# Patient Record
Sex: MALE | Race: WHITE | NOT HISPANIC OR LATINO | Employment: FULL TIME | URBAN - NONMETROPOLITAN AREA
[De-identification: names, ages, dates, MRNs, and addresses within clinical notes are randomized per-mention and may not be internally consistent; named-entity substitution may affect disease eponyms.]

---

## 2024-09-08 ENCOUNTER — HOSPITAL ENCOUNTER (EMERGENCY)
Facility: HOSPITAL | Age: 54
Discharge: OTHER INSTITUTION | End: 2024-09-08
Attending: EMERGENCY MEDICINE
Payer: COMMERCIAL

## 2024-09-08 ENCOUNTER — APPOINTMENT (OUTPATIENT)
Dept: RADIOLOGY | Facility: HOSPITAL | Age: 54
End: 2024-09-08
Payer: COMMERCIAL

## 2024-09-08 ENCOUNTER — HOSPITAL ENCOUNTER (OUTPATIENT)
Dept: CARDIOLOGY | Facility: HOSPITAL | Age: 54
Discharge: HOME | End: 2024-09-08
Payer: COMMERCIAL

## 2024-09-08 VITALS
HEART RATE: 79 BPM | WEIGHT: 150 LBS | OXYGEN SATURATION: 100 % | DIASTOLIC BLOOD PRESSURE: 106 MMHG | SYSTOLIC BLOOD PRESSURE: 160 MMHG

## 2024-09-08 DIAGNOSIS — I21.3 ST ELEVATION MYOCARDIAL INFARCTION (STEMI), UNSPECIFIED ARTERY (MULTI): ICD-10-CM

## 2024-09-08 DIAGNOSIS — I49.01: Primary | ICD-10-CM

## 2024-09-08 LAB
ALBUMIN SERPL BCP-MCNC: 4.6 G/DL (ref 3.4–5)
ALP SERPL-CCNC: 89 U/L (ref 33–120)
ALT SERPL W P-5'-P-CCNC: 33 U/L (ref 10–52)
ANION GAP SERPL CALC-SCNC: 24 MMOL/L (ref 10–20)
AST SERPL W P-5'-P-CCNC: 33 U/L (ref 9–39)
BILIRUB SERPL-MCNC: 0.5 MG/DL (ref 0–1.2)
BUN SERPL-MCNC: 12 MG/DL (ref 6–23)
CALCIUM SERPL-MCNC: 9.7 MG/DL (ref 8.6–10.3)
CARDIAC TROPONIN I PNL SERPL HS: 126 NG/L (ref 0–20)
CARDIAC TROPONIN I PNL SERPL HS: 65 NG/L (ref 0–20)
CHLORIDE SERPL-SCNC: 102 MMOL/L (ref 98–107)
CO2 SERPL-SCNC: 17 MMOL/L (ref 21–32)
CREAT SERPL-MCNC: 1.29 MG/DL (ref 0.5–1.3)
EGFRCR SERPLBLD CKD-EPI 2021: 66 ML/MIN/1.73M*2
ERYTHROCYTE [DISTWIDTH] IN BLOOD BY AUTOMATED COUNT: 13.2 % (ref 11.5–14.5)
GLUCOSE SERPL-MCNC: 211 MG/DL (ref 74–99)
HCT VFR BLD AUTO: 51.1 % (ref 41–52)
HGB BLD-MCNC: 17.2 G/DL (ref 13.5–17.5)
HOLD SPECIMEN: NORMAL
INR PPP: 1 (ref 0.9–1.1)
LIPASE SERPL-CCNC: 28 U/L (ref 9–82)
MCH RBC QN AUTO: 31.9 PG (ref 26–34)
MCHC RBC AUTO-ENTMCNC: 33.7 G/DL (ref 32–36)
MCV RBC AUTO: 95 FL (ref 80–100)
NRBC BLD-RTO: 0.1 /100 WBCS (ref 0–0)
PLATELET # BLD AUTO: 276 X10*3/UL (ref 150–450)
POTASSIUM SERPL-SCNC: 3.8 MMOL/L (ref 3.5–5.3)
PROT SERPL-MCNC: 7 G/DL (ref 6.4–8.2)
PROTHROMBIN TIME: 11.3 SECONDS (ref 9.8–12.8)
RBC # BLD AUTO: 5.39 X10*6/UL (ref 4.5–5.9)
SODIUM SERPL-SCNC: 139 MMOL/L (ref 136–145)
WBC # BLD AUTO: 20.6 X10*3/UL (ref 4.4–11.3)

## 2024-09-08 PROCEDURE — 94681 O2 UPTK CO2 OUTP % O2 XTRC: CPT

## 2024-09-08 PROCEDURE — 84484 ASSAY OF TROPONIN QUANT: CPT | Performed by: PHYSICIAN ASSISTANT

## 2024-09-08 PROCEDURE — 2500000005 HC RX 250 GENERAL PHARMACY W/O HCPCS: Performed by: EMERGENCY MEDICINE

## 2024-09-08 PROCEDURE — 2500000004 HC RX 250 GENERAL PHARMACY W/ HCPCS (ALT 636 FOR OP/ED): Performed by: EMERGENCY MEDICINE

## 2024-09-08 PROCEDURE — 96375 TX/PRO/DX INJ NEW DRUG ADDON: CPT | Mod: 59

## 2024-09-08 PROCEDURE — 31500 INSERT EMERGENCY AIRWAY: CPT

## 2024-09-08 PROCEDURE — 85027 COMPLETE CBC AUTOMATED: CPT | Performed by: PHYSICIAN ASSISTANT

## 2024-09-08 PROCEDURE — 80053 COMPREHEN METABOLIC PANEL: CPT | Performed by: PHYSICIAN ASSISTANT

## 2024-09-08 PROCEDURE — 85610 PROTHROMBIN TIME: CPT | Performed by: PHYSICIAN ASSISTANT

## 2024-09-08 PROCEDURE — 94002 VENT MGMT INPAT INIT DAY: CPT

## 2024-09-08 PROCEDURE — 94799 UNLISTED PULMONARY SVC/PX: CPT

## 2024-09-08 PROCEDURE — 2500000004 HC RX 250 GENERAL PHARMACY W/ HCPCS (ALT 636 FOR OP/ED)

## 2024-09-08 PROCEDURE — 94762 N-INVAS EAR/PLS OXIMTRY CONT: CPT

## 2024-09-08 PROCEDURE — 93005 ELECTROCARDIOGRAM TRACING: CPT

## 2024-09-08 PROCEDURE — 70450 CT HEAD/BRAIN W/O DYE: CPT | Performed by: RADIOLOGY

## 2024-09-08 PROCEDURE — 99292 CRITICAL CARE ADDL 30 MIN: CPT | Mod: 25

## 2024-09-08 PROCEDURE — 2500000004 HC RX 250 GENERAL PHARMACY W/ HCPCS (ALT 636 FOR OP/ED): Performed by: PHYSICIAN ASSISTANT

## 2024-09-08 PROCEDURE — 92950 HEART/LUNG RESUSCITATION CPR: CPT | Performed by: EMERGENCY MEDICINE

## 2024-09-08 PROCEDURE — 71045 X-RAY EXAM CHEST 1 VIEW: CPT | Performed by: RADIOLOGY

## 2024-09-08 PROCEDURE — 36415 COLL VENOUS BLD VENIPUNCTURE: CPT | Performed by: PHYSICIAN ASSISTANT

## 2024-09-08 PROCEDURE — 83690 ASSAY OF LIPASE: CPT | Performed by: PHYSICIAN ASSISTANT

## 2024-09-08 PROCEDURE — 31500 INSERT EMERGENCY AIRWAY: CPT | Performed by: EMERGENCY MEDICINE

## 2024-09-08 PROCEDURE — 70450 CT HEAD/BRAIN W/O DYE: CPT

## 2024-09-08 PROCEDURE — 72125 CT NECK SPINE W/O DYE: CPT | Performed by: RADIOLOGY

## 2024-09-08 PROCEDURE — 99291 CRITICAL CARE FIRST HOUR: CPT | Mod: 25

## 2024-09-08 PROCEDURE — 72125 CT NECK SPINE W/O DYE: CPT

## 2024-09-08 PROCEDURE — 96365 THER/PROPH/DIAG IV INF INIT: CPT | Mod: 59

## 2024-09-08 PROCEDURE — 71045 X-RAY EXAM CHEST 1 VIEW: CPT

## 2024-09-08 PROCEDURE — 96366 THER/PROPH/DIAG IV INF ADDON: CPT | Mod: 59

## 2024-09-08 PROCEDURE — 31720 CLEARANCE OF AIRWAYS: CPT

## 2024-09-08 RX ORDER — MIDAZOLAM HYDROCHLORIDE 1 MG/ML
2 INJECTION INTRAMUSCULAR; INTRAVENOUS ONCE
Status: COMPLETED | OUTPATIENT
Start: 2024-09-08 | End: 2024-09-08

## 2024-09-08 RX ORDER — PROPOFOL 10 MG/ML
INJECTION, EMULSION INTRAVENOUS
Status: COMPLETED
Start: 2024-09-08 | End: 2024-09-08

## 2024-09-08 RX ORDER — HEPARIN SODIUM 10000 [USP'U]/100ML
0-4000 INJECTION, SOLUTION INTRAVENOUS CONTINUOUS
Status: DISCONTINUED | OUTPATIENT
Start: 2024-09-08 | End: 2024-09-08 | Stop reason: HOSPADM

## 2024-09-08 RX ORDER — AMIODARONE HYDROCHLORIDE 150 MG/3ML
INJECTION, SOLUTION INTRAVENOUS CODE/TRAUMA/SEDATION MEDICATION
Status: COMPLETED | OUTPATIENT
Start: 2024-09-08 | End: 2024-09-08

## 2024-09-08 RX ORDER — ONDANSETRON HYDROCHLORIDE 2 MG/ML
INJECTION, SOLUTION INTRAVENOUS
Status: COMPLETED
Start: 2024-09-08 | End: 2024-09-08

## 2024-09-08 RX ORDER — PROPOFOL 10 MG/ML
0-50 INJECTION, EMULSION INTRAVENOUS CONTINUOUS
Status: DISCONTINUED | OUTPATIENT
Start: 2024-09-08 | End: 2024-09-08 | Stop reason: HOSPADM

## 2024-09-08 RX ORDER — SODIUM CHLORIDE 9 MG/ML
125 INJECTION, SOLUTION INTRAVENOUS CONTINUOUS
Status: DISCONTINUED | OUTPATIENT
Start: 2024-09-08 | End: 2024-09-08 | Stop reason: HOSPADM

## 2024-09-08 RX ORDER — VECURONIUM BROMIDE FOR INJECTION 1 MG/ML
10 INJECTION, POWDER, LYOPHILIZED, FOR SOLUTION INTRAVENOUS ONCE
Status: COMPLETED | OUTPATIENT
Start: 2024-09-08 | End: 2024-09-08

## 2024-09-08 RX ORDER — ETOMIDATE 2 MG/ML
INJECTION INTRAVENOUS CODE/TRAUMA/SEDATION MEDICATION
Status: COMPLETED | OUTPATIENT
Start: 2024-09-08 | End: 2024-09-08

## 2024-09-08 RX ORDER — SUCCINYLCHOLINE CHLORIDE 20 MG/ML
INJECTION INTRAMUSCULAR; INTRAVENOUS CODE/TRAUMA/SEDATION MEDICATION
Status: COMPLETED | OUTPATIENT
Start: 2024-09-08 | End: 2024-09-08

## 2024-09-08 NOTE — NURSING NOTE
Patient left with life flight crew on life flights vent. Sat 100%, bilateral breath sounds confirmed after transfer to their cot.

## 2024-09-08 NOTE — NURSING NOTE
Code Blue called at 1721. CPR in progress at RT arrival to ER rm. 100% Ambu initiated immediately via face mask. % sats observed.  Pt intubated by Dr. Castrejon w 8.0 ETT w/o diff. 24 cm @ lipline. Good color change noted on CO2 detector.  BBS confirmed by RN. 100% Ambu continued achieving 100% sat cont.  Pt transported to CT with 100% Ambu and returned to ER. 100% Ambu contd with ETA of 20 min for [facility].

## 2024-09-08 NOTE — ED PROVIDER NOTES
HPI   No chief complaint on file.      Patient presents to the emergency department accompanied by his wife.  They arrived by private transport.  When walking through the front door the patient collapsed suddenly and a rapid response was called.  His wife is providing history.  His wife states that for the past 2 hours the patient has been restless, diaphoretic, and complaining of pain over the anterior chest wall and bilateral shoulders.  He has no known medical history but has not seen a physician in quite some time, years according to his wife.    When the rapid response was called to the front of the hospital the patient was lying on the ground seizing.  Security camera footage shows that the patient did fall and his wife did assist his fall to some extent but it is not known how much.  The patient's seizure was noted to break on his own and I witnessed this and I moved the patient to an EMS gurney with assistance of the nursing staff to take him back to an examination room.      History provided by:  Spouse  History limited by:  Acuity of condition, mental status change and patient unresponsive   used: No            Patient History   No past medical history on file.  No past surgical history on file.  No family history on file.  Social History     Tobacco Use    Smoking status: Not on file    Smokeless tobacco: Not on file   Substance Use Topics    Alcohol use: Not on file    Drug use: Not on file       Physical Exam   ED Triage Vitals   Temp Pulse Resp BP   -- -- -- --      SpO2 Temp src Heart Rate Source Patient Position   -- -- -- --      BP Location FiO2 (%)     -- --       Physical Exam  Vitals and nursing note reviewed.   Constitutional:       General: He is in acute distress.      Appearance: He is normal weight. He is ill-appearing, toxic-appearing and diaphoretic.      Comments: Patient is postictal with sonorous respirations when he is being brought back on the ER gurney from the  triage area.  He is notably diaphoretic.   HENT:      Head: Normocephalic and atraumatic.      Nose: Nose normal. No rhinorrhea.      Mouth/Throat:      Comments: Patient has dentures that are removed by the nursing staff  Neck:      Comments: Trachea is midline  Cardiovascular:      Rate and Rhythm: Regular rhythm. Tachycardia present.      Heart sounds: No murmur heard.  Pulmonary:      Comments: Patient has sonorous respirations and is postictal  Abdominal:      General: Abdomen is flat. Bowel sounds are normal. There is no distension.      Palpations: Abdomen is soft.      Tenderness: There is no abdominal tenderness.   Musculoskeletal:         General: No deformity.   Skin:     General: Skin is warm.      Findings: No rash.      Comments: Patient is diaphoretic.  Patient has noted erythema to the face and anterior neck.   Neurological:      Mental Status: He is disoriented.      Comments: Patient is postictal   Psychiatric:      Comments: Patient is unresponsive and postictal           ED Course & MDM   Diagnoses as of 09/08/24 1759   Ventricular fibrillation seen on cardiac monitor (Multi)   ST elevation myocardial infarction (STEMI), unspecified artery (Multi)                 No data recorded                                 Medical Decision Making  After the patient was brought back to ER room #1 from the triage area he was noted to be postictal.  He had sonorous respirations and then became apneic and pulseless.  CPR was initiated immediately by the nursing staff.  The patient was then noted to have a period where he was breathing on his own however when he was placed on the cardiac monitor he was noted to be in ventricular fibrillation.  The patient was cardioverted with 200 J of synchronized electricity and CPR was reinitiated immediately.  At this time the patient was given 300 mg of IV amiodarone.  The patient was noted to be in sinus tachycardia at the first pulse check and was breathing on his own.  He  was still however not responsive.  At this point I electively intubated the patient as I did not feel he could protect his airway and in anticipation of transfer to a higher level of care.  Please see my separate procedure note for this procedure which was performed without difficulty and on the first attempt.    Twelve-lead EKG was interpreted by myself this was noted to contribute directly to patient care.  Study reveals a sinus tachycardia 134 bpm, and notable ST segment elevation in leads V3 through V5 consistent with an anterior wall myocardial infarction.    Immediately after the EKG was obtained a STEMI alert was paged overhead.    The patient's wife is at bedside and she was updated to the patient's condition by myself.  I explained that the patient is having a myocardial infarction and will need to be transferred to a higher level of care.  The patient's wife is requesting that the patient be transferred to [facility] in [place].  I spoke to Dr. Waldo Pena who is the interventional cardiologist covering that facility and he accepted the patient to his service.    The patient did fall out in triage and it is uncertain if he struck his head or not.  Therefore CAT scan of the patient's head and cervical spine were performed out of concern for trauma related to this fall.  I did not identify any evidence of large intracranial hemorrhage on the CT therefore heparin was initiated.    The patient's wife and daughter are at bedside and I updated them and they are in agreement with this treatment course and plan.    Critical care time for this patient excluding billable procedures is 100 minutes secondary to multiple repeat physical examinations, interpretation of radiographic studies, and expert consultation.        Procedure  Intubation    Performed by: Aubrey Castrejon DO  Authorized by: Aubrey Castrejon DO    Consent:     Consent obtained:  Emergent situation    Consent given by:  Spouse    Risks,  benefits, and alternatives were discussed: yes      Risks discussed:  Death, aspiration and brain injury    Alternatives discussed:  No treatment  Universal protocol:     Procedure explained and questions answered to patient or proxy's satisfaction: yes      Relevant documents present and verified: yes      Test results available: no      Imaging studies available: no      Required blood products, implants, devices, and special equipment available: yes      Site/side marked: yes      Immediately prior to procedure, a time out was called: yes      Patient identity confirmed:  Arm band  Pre-procedure details:     Indications: altered consciousness and cardio/pulmonary arrest      Patient status:  Unresponsive    Look externally: no concerns      Mouth opening - incisor distance:  3 or more finger widths    Hyoid-mental distance: 3 or more finger widths      Hyoid-thyroid distance: 2 or more finger widths      Mallampati score:  I    Obstruction: none      Neck mobility: normal      Pharmacologic strategy: RSI      Induction agents:  Etomidate    Paralytics:  Succinylcholine  Procedure details:     Preoxygenation:  Bag valve mask    CPR in progress: no      Number of attempts:  1  Successful intubation attempt details:     Intubation method:  Oral    Intubation technique: video assisted      Laryngoscope blade:  Mac 4    Bougie used: no      Grade view: I      Tube size (mm):  8.0    Tube type:  Cuffed    Tube visualized through cords: yes    Placement assessment:     ETT at teeth/gumline (cm):  24    Tube secured with:  ETT kay    Breath sounds:  Equal    Placement verification: chest rise, colorimetric ETCO2, CXR verification and equal breath sounds      CXR findings:  Appropriate position  Post-procedure details:     Procedure completion:  Tolerated well, no immediate complications       Aubrey Castrejon DO  09/08/24 8936

## 2024-09-15 LAB
Q ONSET: 221 MS
QRS COUNT: 22 BEATS
QRS DURATION: 70 MS
QT INTERVAL: 312 MS
QTC CALCULATION(BAZETT): 465 MS
QTC FREDERICIA: 407 MS
R AXIS: -55 DEGREES
T AXIS: 36 DEGREES
T OFFSET: 377 MS
VENTRICULAR RATE: 134 BPM

## 2024-10-01 ENCOUNTER — EVALUATION (OUTPATIENT)
Dept: SPEECH THERAPY | Facility: CLINIC | Age: 54
End: 2024-10-01
Payer: COMMERCIAL

## 2024-10-01 DIAGNOSIS — G31.84 MILD COGNITIVE IMPAIRMENT WITH MEMORY LOSS: ICD-10-CM

## 2024-10-01 DIAGNOSIS — R41.841 COGNITIVE COMMUNICATION DEFICIT: Primary | ICD-10-CM

## 2024-10-01 PROCEDURE — 96125 COGNITIVE TEST BY HC PRO: CPT | Mod: GN | Performed by: SPEECH-LANGUAGE PATHOLOGIST

## 2024-10-01 ASSESSMENT — PATIENT HEALTH QUESTIONNAIRE - PHQ9
2. FEELING DOWN, DEPRESSED OR HOPELESS: NOT AT ALL
1. LITTLE INTEREST OR PLEASURE IN DOING THINGS: NOT AT ALL
SUM OF ALL RESPONSES TO PHQ9 QUESTIONS 1 AND 2: 0

## 2024-10-01 ASSESSMENT — SOCIAL DETERMINANTS OF HEALTH (SDOH)
WITHIN THE LAST YEAR, HAVE TO BEEN RAPED OR FORCED TO HAVE ANY KIND OF SEXUAL ACTIVITY BY YOUR PARTNER OR EX-PARTNER?: NO
WITHIN THE LAST YEAR, HAVE YOU BEEN HUMILIATED OR EMOTIONALLY ABUSED IN OTHER WAYS BY YOUR PARTNER OR EX-PARTNER?: NO
WITHIN THE LAST YEAR, HAVE YOU BEEN KICKED, HIT, SLAPPED, OR OTHERWISE PHYSICALLY HURT BY YOUR PARTNER OR EX-PARTNER?: NO
WITHIN THE LAST YEAR, HAVE YOU BEEN AFRAID OF YOUR PARTNER OR EX-PARTNER?: NO

## 2024-10-01 ASSESSMENT — ENCOUNTER SYMPTOMS
DEPRESSION: 0
OCCASIONAL FEELINGS OF UNSTEADINESS: 0
LOSS OF SENSATION IN FEET: 0

## 2024-10-01 ASSESSMENT — PAIN SCALES - GENERAL
PAINLEVEL_OUTOF10: 2
PAINLEVEL_OUTOF10: 2

## 2024-10-01 ASSESSMENT — PAIN - FUNCTIONAL ASSESSMENT
PAIN_FUNCTIONAL_ASSESSMENT: 0-10
PAIN_FUNCTIONAL_ASSESSMENT: 0-10

## 2024-10-01 NOTE — PROGRESS NOTES
Speech-Language Pathology    SLP Adult Outpatient Speech-Language Cognition    Patient Name: Christopher D Hosler  MRN: [id number]  Today's Date: 10/1/2024      Time Calculation  Start Time: 1435  Stop Time: 1530  Time Calculation (min): 55 min      Current Problem:   1. Cognitive communication deficit  Follow Up In Speech Therapy      2. Mild cognitive impairment with memory loss  Referral to Speech Therapy            SLP Assessment:  SLP Assessment  SLP TX Intervention Outcome: Making Progress Towards Goals  SLP Assessment Results: Expression deficits  Prognosis: Excellent  Treatment Provided: No  Medical Staff Made Aware: Yes  Strengths: Cognition, Motivation, Family/Caregiver Support  Barriers: None  Education Provided: Yes      SLP Plan:  Plan  Inpatient/Swing Bed or Outpatient: Outpatient  Treatment/Interventions: Communication functioning  SLP TX Plan: Continue Plan of Care  SLP Plan: Skilled SLP  SLP Frequency: One time visit  Duration: 30 days  SLP Discharge Recommendations: Home independent  Next Treatment Priority: WAB  Discussed POC: Patient, Caregiver/family  Discussed Risks/Benefits: Yes  Patient/Caregiver Agreeable: Yes  SLP - OK to Discharge: No    Cognitive-Communication Goal:  In one month...  Nayan will complete the Western Aphasia Battery (WAB), Third Edition in order to further assess language skills and inform POC as appropriate.   Goal Start: 10/1/2024  Anticipated End: 11/1/2024  Goal End:      Subjective   Current Problem:  Nayan was accompanied by his spouse to today's evaluation.   He does not feel there are any outstanding speech or cognitive deficits. His spouse reported continued improvements with cognition daily, though still some difficulty with short term recall.     Patient had cardiac arrest, was placed in a medically induced coma during hospitalization.   Briefly seen at acute rehab though discharged after 3 days - recommended follow up with OP ST for cognitive  communication deficits.   Per spouse, patient currently still has blood clot in heart but is on blood thinners. Is wearing life vest.     Patient's goal for therapy:  Be able to return to [facility] for all ADLs.     Most Recent Visit:  SLP Most Recent Visit  SLP Received On: 10/01/24      General Visit Information:  General Information  Chart Reviewed: Yes  Arrival: Family/caregiver present  Reason for Referral: Cognitive communication deficits  Referred By: Dr. Lopez  Past Medical History Relevant to Rehab: MI  Patient Seen During This Visit: Yes  Total Number of Visits : 1      Objective     Pain:  Pain Assessment  Pain Assessment: 0-10  0-10 (Numeric) Pain Score: 2  Pain Type: Acute pain  Pain Location: Chest  Pain Orientation: Left      Cognition:  Cognition  Overall Cognitive Status: Within Functional Limits  Attention: Within Functional Limits  Memory: Within Funtional Limits  Problem Solving: Within Functional Limits      Auditory Comprehension:   Auditory Comprehension  Yes/No Questions: Within Functional Limits  Commands: Within Functional Limits      Verbal:  Verbal Expression  Primary Mode of Expression: Verbal  Primary Language: English  Confrontation Naming: Within Functional Limits  Responsive Naming: Impaired  Open Ended Questions: Within Functional Limits  Conversation: Within Functional Limits      SLP Outcome Measures:  The Cognitive Linguistic Quick Test (CLQT) was administered in order to assess Nayan's cognitive-linguistic function compared to same age individuals.  The CLQT is a standardized assessment that obtains information on the individual's attention, memory, executive function, language, and visuospatial skills. Based on the scores, the person's skills are then given a severity rating from within functional limits up to a severe impairment.    SUBTEST SCORES:  Personal Facts 8/8  Symbol Cancellation 12/12  Confrontation Naming 10/10  Clock Drawing 13/13  Story Retelling 9/10  Symbol  Trails 10/10  Generative Naming 5/9  Design Memory 6/6  Mazes 8/8  Design Generation     COMPOSITE SCORES:      Score   Severity Rating  Attention   208   WFL  Memory   175   WFL  Executive Function  31   WFL  Language   32   WFL  Visuospatial Skills  100   WFL  Clock Drawing   13   WFL    Overall Composite Severity Ratin.0 - WFL       Outpatient Education:  Adult Outpatient Education  Individual(s) Educated: Patient, Spouse  Verbal Education : Results of the assessment, activities for cognitive communication skills at home  Risk and Benefits Discussed with Patient/Caregiver/Other: yes  Patient/Caregiver Demonstrated Understanding: yes  Plan of Care Discussed and Agreed Upon: yes  Patient Response to Education: Patient/Caregiver Verbalized Understanding of Information, Patient/Caregiver Asked Appropriate Questions

## 2024-10-01 NOTE — LETTER
October 1, 2024    Dipika Lopez MD  [address] [facility] [address]    Patient: Christopher D Hosler   YOB: 1970   Date of Visit: 10/1/2024       Dear No referring provider defined for this encounter.    The attached plan of care is being sent to you because your patient’s medical reimbursement requires that you certify the plan of care. Your signature is required to allow uninterrupted insurance coverage.      You may indicate your approval by signing below and faxing this form back to us at Dept Fax: [phone].    Please call Dept: [phone] with any questions or concerns.    Thank you for this referral,        Hollie Sherwood CCC-SLP  [address] [address]    Payer: Payor: [employer] / Plan: [employer] HEALTH PLAN / Product Type: *No Product type* /                                                                         Date:     Dear VIVIANE Robbins,     Re: Mr. Christopher Hosler, MRN:[id number]    I certify that I have reviewed the attached plan of care and it is medically necessary for Mr. Christopher Hosler (1970) who is under my care.          ______________________________________                    _________________  Provider name and credentials                                           Date and time                                                                                           Plan of Care 10/1/24   Effective from: 10/1/2024  Effective to: 11/1/2024    Plan ID: [id number]            Participants as of Finalize on 10/1/2024    Name Type Comments Contact Info    Dipika Lopez MD Referring Provider  [phone]    Hollie Sherwood CCC-SLP Speech Language Pathologist  [phone]       Last Plan Note     Author: VIVIANE Robbins Status: Signed Last edited: 10/1/2024  2:30 PM       Speech-Language Pathology    SLP Adult Outpatient Speech-Language Cognition    Patient Name:  Christopher D Hosler  MRN: [id number]  Today's Date: 10/1/2024      Time Calculation  Start Time: 1435  Stop Time: 1530  Time Calculation (min): 55 min      Current Problem:   1. Cognitive communication deficit  Follow Up In Speech Therapy      2. Mild cognitive impairment with memory loss  Referral to Speech Therapy            SLP Assessment:  SLP Assessment  SLP TX Intervention Outcome: Making Progress Towards Goals  SLP Assessment Results: Expression deficits  Prognosis: Excellent  Treatment Provided: No  Medical Staff Made Aware: Yes  Strengths: Cognition, Motivation, Family/Caregiver Support  Barriers: None  Education Provided: Yes      SLP Plan:  Plan  Inpatient/Swing Bed or Outpatient: Outpatient  Treatment/Interventions: Communication functioning  SLP TX Plan: Continue Plan of Care  SLP Plan: Skilled SLP  SLP Frequency: One time visit  Duration: 30 days  SLP Discharge Recommendations: Home independent  Next Treatment Priority: WAB  Discussed POC: Patient, Caregiver/family  Discussed Risks/Benefits: Yes  Patient/Caregiver Agreeable: Yes  SLP - OK to Discharge: No    Cognitive-Communication Goal:  In one month...  Nayan will complete the Western Aphasia Battery (WAB), Third Edition in order to further assess language skills and inform POC as appropriate.   Goal Start: 10/1/2024  Anticipated End: 11/1/2024  Goal End:      Subjective   Current Problem:  Nayan was accompanied by his spouse to today's evaluation.   He does not feel there are any outstanding speech or cognitive deficits. His spouse reported continued improvements with cognition daily, though still some difficulty with short term recall.     Patient had cardiac arrest, was placed in a medically induced coma during hospitalization.   Briefly seen at acute rehab though discharged after 3 days - recommended follow up with OP ST for cognitive communication deficits.   Per spouse, patient currently still has blood clot in heart but is on blood  thinners. Is wearing life vest.     Patient's goal for therapy:  Be able to return to [facility] for all ADLs.     Most Recent Visit:  SLP Most Recent Visit  SLP Received On: 10/01/24      General Visit Information:  General Information  Chart Reviewed: Yes  Arrival: Family/caregiver present  Reason for Referral: Cognitive communication deficits  Referred By: Dr. Lopez  Past Medical History Relevant to Rehab: MI  Patient Seen During This Visit: Yes  Total Number of Visits : 1      Objective     Pain:  Pain Assessment  Pain Assessment: 0-10  0-10 (Numeric) Pain Score: 2  Pain Type: Acute pain  Pain Location: Chest  Pain Orientation: Left      Cognition:  Cognition  Overall Cognitive Status: Within Functional Limits  Attention: Within Functional Limits  Memory: Within Funtional Limits  Problem Solving: Within Functional Limits      Auditory Comprehension:   Auditory Comprehension  Yes/No Questions: Within Functional Limits  Commands: Within Functional Limits      Verbal:  Verbal Expression  Primary Mode of Expression: Verbal  Primary Language: English  Confrontation Naming: Within Functional Limits  Responsive Naming: Impaired  Open Ended Questions: Within Functional Limits  Conversation: Within Functional Limits      SLP Outcome Measures:  The Cognitive Linguistic Quick Test (CLQT) was administered in order to assess Nayan's cognitive-linguistic function compared to same age individuals.  The CLQT is a standardized assessment that obtains information on the individual's attention, memory, executive function, language, and visuospatial skills. Based on the scores, the person's skills are then given a severity rating from within functional limits up to a severe impairment.    SUBTEST SCORES:  Personal Facts 8/8  Symbol Cancellation 12/12  Confrontation Naming 10/10  Clock Drawing 13/13  Story Retelling 9/10  Symbol Trails 10/10  Generative Naming 5/9  Design Memory 6/6  Mazes 8/8  Design Generation 8/13    COMPOSITE  SCORES:      Score   Severity Rating  Attention   208   WFL  Memory   175   WFL  Executive Function  31   WFL  Language   32   WFL  Visuospatial Skills  100   WFL  Clock Drawing   13   WFL    Overall Composite Severity Ratin.0 - WFL       Outpatient Education:  Adult Outpatient Education  Individual(s) Educated: Patient, Spouse  Verbal Education : Results of the assessment, activities for cognitive communication skills at home  Risk and Benefits Discussed with Patient/Caregiver/Other: yes  Patient/Caregiver Demonstrated Understanding: yes  Plan of Care Discussed and Agreed Upon: yes  Patient Response to Education: Patient/Caregiver Verbalized Understanding of Information, Patient/Caregiver Asked Appropriate Questions           Current Participants as of 10/1/2024    Name Type Comments Contact Info    Dipika Lopez MD Referring Provider  [phone]    Signature pending    Hollie Sherwood CCC-SLP Speech Language Pathologist  [phone]

## 2024-10-14 ENCOUNTER — TRANSCRIBE ORDERS (OUTPATIENT)
Dept: CARDIAC REHAB | Facility: HOSPITAL | Age: 54
End: 2024-10-14
Payer: COMMERCIAL

## 2024-10-14 DIAGNOSIS — I21.3 ST ELEVATION MYOCARDIAL INFARCTION (STEMI), UNSPECIFIED ARTERY (MULTI): ICD-10-CM

## 2024-10-14 DIAGNOSIS — I46.9 CARDIAC ARREST: ICD-10-CM

## 2024-10-16 ENCOUNTER — APPOINTMENT (OUTPATIENT)
Dept: SPEECH THERAPY | Facility: CLINIC | Age: 54
End: 2024-10-16
Payer: COMMERCIAL

## 2024-10-23 ENCOUNTER — TREATMENT (OUTPATIENT)
Dept: SPEECH THERAPY | Facility: CLINIC | Age: 54
End: 2024-10-23
Payer: COMMERCIAL

## 2024-10-23 ENCOUNTER — CLINICAL SUPPORT (OUTPATIENT)
Dept: CARDIAC REHAB | Facility: HOSPITAL | Age: 54
End: 2024-10-23
Payer: COMMERCIAL

## 2024-10-23 ENCOUNTER — APPOINTMENT (OUTPATIENT)
Dept: PRIMARY CARE | Facility: CLINIC | Age: 54
End: 2024-10-23
Payer: COMMERCIAL

## 2024-10-23 VITALS
DIASTOLIC BLOOD PRESSURE: 70 MMHG | HEIGHT: 70 IN | BODY MASS INDEX: 21.04 KG/M2 | WEIGHT: 146.98 LBS | OXYGEN SATURATION: 97 % | SYSTOLIC BLOOD PRESSURE: 117 MMHG

## 2024-10-23 DIAGNOSIS — I46.9 CARDIAC ARREST: ICD-10-CM

## 2024-10-23 DIAGNOSIS — I21.3 ST ELEVATION MYOCARDIAL INFARCTION (STEMI), UNSPECIFIED ARTERY (MULTI): ICD-10-CM

## 2024-10-23 DIAGNOSIS — R41.841 COGNITIVE COMMUNICATION DEFICIT: ICD-10-CM

## 2024-10-23 PROCEDURE — 92507 TX SP LANG VOICE COMM INDIV: CPT | Mod: GN

## 2024-10-23 ASSESSMENT — PATIENT HEALTH QUESTIONNAIRE - PHQ9
SUM OF ALL RESPONSES TO PHQ9 QUESTIONS 1 & 2: 0
1. LITTLE INTEREST OR PLEASURE IN DOING THINGS: NOT AT ALL
SUM OF ALL RESPONSES TO PHQ QUESTIONS 1-9: 3
7. TROUBLE CONCENTRATING ON THINGS, SUCH AS READING THE NEWSPAPER OR WATCHING TELEVISION: NOT AT ALL
8. MOVING OR SPEAKING SO SLOWLY THAT OTHER PEOPLE COULD HAVE NOTICED. OR THE OPPOSITE, BEING SO FIGETY OR RESTLESS THAT YOU HAVE BEEN MOVING AROUND A LOT MORE THAN USUAL: NOT AT ALL
6. FEELING BAD ABOUT YOURSELF - OR THAT YOU ARE A FAILURE OR HAVE LET YOURSELF OR YOUR FAMILY DOWN: NOT AT ALL
SUM OF ALL RESPONSES TO PHQ QUESTIONS 1-9: 3
2. FEELING DOWN, DEPRESSED OR HOPELESS: NOT AT ALL
5. POOR APPETITE OR OVEREATING: NOT AT ALL
9. THOUGHTS THAT YOU WOULD BE BETTER OFF DEAD, OR OF HURTING YOURSELF: NOT AT ALL
4. FEELING TIRED OR HAVING LITTLE ENERGY: SEVERAL DAYS
3. TROUBLE FALLING OR STAYING ASLEEP OR SLEEPING TOO MUCH: MORE THAN HALF THE DAYS

## 2024-10-23 ASSESSMENT — PAIN - FUNCTIONAL ASSESSMENT: PAIN_FUNCTIONAL_ASSESSMENT: 0-10

## 2024-10-23 ASSESSMENT — DUKE ACTIVITY SCORE INDEX (DASI)
CAN YOU PARTICIPATE IN STRENOUS SPORTS LIKE SWIMMING, SINGLES TENNIS, FOOTBALL, BASKETBALL, OR SKIING: NO
CAN YOU DO MODERATE WORK AROUND THE HOUSE LIKE VACUUMING, SWEEPING FLOORS OR CARRYING GROCERIES: YES
CAN YOU RUN A SHORT DISTANCE: NO
CAN YOU HAVE SEXUAL RELATIONS: YES
CAN YOU CLIMB A FLIGHT OF STAIRS OR WALK UP A HILL: YES
CAN YOU DO HEAVY WORK AROUND THE HOUSE LIKE SCRUBBING FLOORS OR LIFTING AND MOVING HEAVY FURNITURE: NO
CAN YOU DO LIGHT WORK AROUND THE HOUSE LIKE DUSTING OR WASHING DISHES: YES
TOTAL_SCORE: 24.2
CAN YOU PARTICIPATE IN MODERATE RECREATIONAL ACTIVITIES LIKE GOLF, BOWLING, DANCING, DOUBLES TENNIS OR THROWING A BASEBALL OR FOOTBALL: NO
CAN YOU WALK A BLOCK OR TWO ON LEVEL GROUND: YES
DASI METS SCORE: 5.7
CAN YOU DO YARD WORK LIKE RAKING LEAVES, WEEDING OR PUSHING A MOWER: NO
CAN YOU TAKE CARE OF YOURSELF (EAT, DRESS, BATHE, OR USE TOILET): YES
CAN YOU WALK INDOORS, SUCH AS AROUND YOUR HOUSE: YES

## 2024-10-23 ASSESSMENT — PAIN SCALES - GENERAL: PAINLEVEL_OUTOF10: 0 - NO PAIN

## 2024-10-23 NOTE — PROGRESS NOTES
Speech-Language Pathology    SLP Adult Outpatient Speech-Language Pathology Treatment     Patient Name: Christopher D Hosler  MRN: [id number]  Today's Date: 10/23/2024     Time Calculation  Start Time: 1515  Stop Time: 1540  Time Calculation (min): 25 min      Current Problem:   1. Cognitive communication deficit  Follow Up In Speech Therapy            SLP Assessment:  SLP TX Intervention Outcome: Making Progress Towards Goals  SLP Assessment Results: Expression deficits  Prognosis: Excellent  Treatment Tolerance: Patient tolerated treatment well  Strengths: Cognition, Motivation  Barriers: None  Education Provided: Yes       Plan:  Inpatient/Swing Bed or Outpatient: Outpatient  SLP TX Plan: Discharge from Speech Therapy  SLP Plan: No skilled SLP  No Skilled SLP: Skills appropriate for age level  Discussed Risks/Benefits: Yes  Patient/Caregiver Agreeable: Yes  SLP - OK to Discharge: Yes      Subjective   Christopher D Hosler arrived independently. They transferred with no difficulty. They were pleasant, cooperative, and participated in all therapy activities.     Christopher D Hosler reported that he has no speech/language/cognitive concerns.     Most Recent Visit:  SLP Received On: 10/23/24    General Visit Information:   Referred By: Dr. Lopez  Past Medical History Relevant to Rehab: MI  Total Number of Visits : 2      Pain Assessment:  Pain Assessment  Pain Assessment: 0-10  0-10 (Numeric) Pain Score: 0 - No pain      Objective   Cognitive-Communication Goal:  In one month...  Nayan will complete the Western Aphasia Battery (WAB), Third Edition in order to further assess language skills and inform POC as appropriate. GOAL MET               Goal Start: 10/1/2024              Anticipated End: 11/1/2024                Goal End: 10/23/24      Treatment Data  The Western Aphasia Battery, Revised (WAB-R) was administered in order to assess Christopher D Hosler's language function following an acquired  neurological disorder. The WAB-R measures both linguistic and non-linguistics skills including speech content, fluency, auditory comprehension, repetition, naming, reading, writing, drawing, calculation, block design and apraxia. The composite scores obtained are an Aphasia Quotient, Language Quotient, and Cortical Quotient.     The purpose of the WAB-R is to determine the presence, severity, and type of aphasia; measure the patient's level of performance to provide a baseline for detecting any change over time; and provide comprehensive assessment of the patient's language assets and deficits in order to guide treatment and management.    Christopher D Hosler received the following scores:    SUBCATEGORIES  Spontaneous Speech 10/10  Fluency, Grammatical Competence, and Paraphasias: 10/10  Yes/No Questions 60/60  Auditory Word Recognition 60/60   Sequential Commands 80/80  Repetition 100/100  Object Naming 60/60  Word Fluency 17/20  Sentence Completion 1010  Responsive Speech 10/10      APHASIA QUOTIENT   Spontaneous Speech: 20/20  Auditory Verbal Comprehension: 10/10  Repetition: 10/10  Naming and Word Findin.7/10     Aphasia Quotient: 99.4/100  Aphasia Type: Anomic Aphasia    Outpatient Education:  Adult Outpatient Education  Individual(s) Educated: Patient  Verbal Education : Results of the assessment, activities for cognitive communication skills at home  Risk and Benefits Discussed with Patient/Caregiver/Other: yes  Patient/Caregiver Demonstrated Understanding: yes  Plan of Care Discussed and Agreed Upon: yes  Patient Response to Education: Patient/Caregiver Verbalized Understanding of Information, Patient/Caregiver Asked Appropriate Questions      Speech-Language Pathology    Discharge Summary    Name: Christopher D Hosler  MRN: [id number]  : 1970  Date: 10/23/24    Discharge Summary: SLP    Discharge Information: Date of discharge 10/23/24, Date of last visit 10/23/24, Date of evaluation 10/1/24,  and Number of attended visits 2    Therapy Summary: Christopher D Hosler received speech therapy to determine cognitive communication function post medically induced coma.     Discharge Status: At this time, Christopher D Hosler  is discharged d/t all goals met. If Christopher D Hosler  would like to receive ST services in the future, Christopher D Hosler  will need a new order from a physician.       Rehab Discharge Reason: Achieved all and/or the most significant goals(s)

## 2024-10-23 NOTE — PROGRESS NOTES
Cardiac Rehabilitation Initial Treatment Plan    Name: Christopher D Hosler  Medical Record Number: [id number]  YOB: 1970  Age: 54 y.o.    Today’s Date: 10/23/2024  Primary Care Physician: Dipika Lopez MD  Referring Physician: Cristi Joseph MD  Program Location: [address]   Exercise Start Date:    General  Primary Diagnosis:   1. ST elevation myocardial infarction (STEMI), unspecified artery (Multi)  Referral to Cardiac Rehab      2. Cardiac arrest  Referral to Cardiac Rehab         Onset/Date of Diagnosis: 9/8/2024    Initial Assessment, not yet started program.    AACVPR Risk Stratification: Moderate    Falls Risk: Low  Psychosocial Assessment     Pre PHQ-9: 3    Post PHQ-9:  Sent PH-Q 9 to MD if score > 20: NO; score <20      Pt reported/currently experiencing stress: No  Patient uses stress management skills: Yes   History of: no history of anxiety or depression  Currently seeing a mental health provider: No  Social Support: Yes, Whom:Spouse  Quality of Life Survey: SF-36   SF-36 Pre Post   Physical Component Score 36.17 TBD   Mental Component Score 55.92 TBD     Learning Assessment:  Learning assessment/barriers: None  Preferred learning method:  ALL  Barriers: None  Comments: NONE    Stages of Change:Action    Psychosocial Plan    Goal Status: Initial Assessment; goals not yet started    Psychosocial Goals:   1. Maintain or decrease PHQ-9 score of 3 by discharge.  2. Improve SF36 scores by discharge. Will retake prior to discharge.  3. Question patient on new or current psychological issues at least every 30 days.  4. Educate patient on Stress Management skills and apply them to reported stressors while in the program.    Psychosocial Interventions/Education:   Provide one on one emotional support as needed.   Current PQ9 score at 3. 10/23  Patient reports current Emotional and Stress level at 4-5. 10-23    Nutrition Assessment:    Hyperlipidemia:  Only see High  "Triglycerides on patients chart.    No results found in chart 10/23/2024  Lipids:   No results found for: \"CHOL\", \"HDL\", \"LDLF\", \"TRIG\"    Current Dietary Guidelines:  PYP scores  48/96 Unlikely to meet current recommendations for good health and has room for improvement.  Barriers to dietary change: no    Diet Habit Survey: Picture Your Plate  Pre:  48/96  Post: To be done at discharge.    Diabetes Assessment    No results found for: \"HGBA1C\"    History of Diabetes: No    Weight Management    Height: 70\"  Weight LBS: PRE: 147   POST:  Change:   BMI (Calculated): 21.1  Waist Circumference: PRE: 33\"  POST:  LOST:    Nutrition Plan    Goal Status: Initial Assessment; goals not yet started    Nutrition Goals:   Maintain or Improve your \"Picture Your Plate Score\" from  48 to 60 or greater by discharge.  Lose 1 inch from waist by discharge ( 33 inches on admit).  Provide education on nutritional aspects related to cardiac health and discuss dietary educational topics while in the program.  Educate patient on their admitting Diagnosis of STEMI / Cardiac Arrest , and their medical HX and how they are relevant to their health.    Patient has a HX of STEMI, CAD, Acute systolic heart failure.    Nutrition Interventions/Education:   1. Discuss \"Picture Your Plate Score\" within the first 6 weeks of program.      Exercise Assessment    Home Exercise: no  Mode:   Frequency:   Duration:     Exercise Prescription     Exercise Prescription based on: 6 Minute Walk Test     DASI: TL SCORE: 24.2     MET Score:  5.7    6MWT: Yes  Pre Test O2 Sat/HR: 97/52  6 Minute O2 Sat/HR: 98/59  Distance Walked(ft): 1472  Greer Dyspnea: 0  Greer PRE: 0  Post Test O2 Sat/HR: 99/51  Adverse Reactions: none      Frequency:  3 days/week   Mode:  Aerobic   Duration: >30 total aerobic minutes   Intensity: RPE <15  Target HR:   Rest+30  MET Level: PRE: 3.13   POST:  Patient wears supplemental O2: No  Current Max exercise Mets:     Modality METs Workload LVL " Duration (minutes)   1 Pre-Exercise       2 Rest     3 :00   3 NuStep 3.1 30 RICHARDSON  1 12 :00   4 Recumbent Bike 3.1 21 RICHARDSON  1 12 :00   5 Treadmill 3.1 2.7 MPH   12 :00   6 Weights    5:00   7 Final Cool Down    3:00   8 Post-Exercise         Maintain Heart Rate at 80-90% of Maximum for patients age 133-150   Resistance Training: Yes to start on patient 9th visit.  Home Exercise Prescription given: To be given prior to discharge from program.    Exercise Plan    Goal Status: Initial Assessment; goals not yet started    Exercise Goals:   Improve post 6MW by discharge.  Increase Mets to 5.0 by the end of the program.  Start exercise program at home second week into the program.  Gain independence and confidence with exercise while in the program.   Have a plan for continuing exercise after completion of program.    Exercise Interventions/Education:   What exactly are METs handout provided and discussed Verbalized understanding. 10/23  OBDULIO exertion handout provided and discussed patient verbalized understanding. 10/23  Increase METs by 1.0 every weeks or as tolerated.  Patient is not doing any home exercises. We had a discussion on the importance of exercising at home.     Other Core Components/Risk Factor Assessment:    Medication adherence  Current Medications:        Start Date End Date      amiodarone 400 MG tablet  Take 1 tablet by mouth daily. 30 tablet  5 09/26/2024     apixaban 5 MG tablet   Indications: L Ventricular Thrombus Post MI Take 1 tablet by mouth every 12 hours for 90 days, THEN 1 tablet every 12 hours. 60 tablet  3 09/26/2024 01/24/2025   aspirin 81 MG Chew Tab chewable tablet  Chew 1 tablet daily. 30 tablet    09/26/2024     atorvastatin 80 MG tablet  Take 1 tablet by mouth at bedtime. 30 tablet  3 09/26/2024     Clopidogrel 75 MG tablet  Take 1 tablet by mouth daily. 30 tablet  3 09/26/2024     Folic acid 1 MG tablet  Take 1 tablet by mouth daily. 30 tablet  3 09/26/2024     Melatonin 3  MG tablet  Take 1 tablet by mouth at bedtime. 30 tablet  3 09/26/2024     Metoprolol succinate 25 MG tablet XL  Take 1 tablet by mouth daily. 30 tablet  3 09/26/2024     Ondansetron 4 MG tablet  Take 1 tablet by mouth every 6 hours as needed for Nausea / Vomiting. 30 tablet    09/26/2024     Pantoprazole 40 MG Tab DR tablet DR   Indications: Inpt Stress Ulcer Prophylaxis Take 1 tablet by mouth daily. 30 tablet  2 09/26/2024     sacubitril-valsartan 24-26 MG tablet  Take 1 tablet by mouth every 12 hours. 60 tablet  2 09/26/2024     Senna 8.6 MG tablet  Take 1 tablet by mouth daily as needed. 30 tablet  2 09/26/2024     triamcinolone 0.1 % Ointment ointment  Apply to rash     09/23/2024 09/30/2024   Ipratropium-albuterol 0.5-2.5 (3) MG/3ML nebulizer solution  Take 3 mL by nebulization every 6 hours as needed for Breathing Treatment. 100 mL    09/26/2024 10/22/2024     Prescription Sig Dispensed Refills Start Date End Date   Senna 8.6 MG tablet  Take 1 tablet by mouth daily as needed. 30 tablet  2 09/26/2024     Pantoprazole 40 MG Tab DR tablet DR   Indications: Inpt Stress Ulcer Prophylaxis Take 1 tablet by mouth daily. 30 tablet  2 09/26/2024     Ondansetron 4 MG tablet  Take 1 tablet by mouth every 6 hours as needed for Nausea / Vomiting. 30 tablet    09/26/2024     Melatonin 3 MG tablet  Take 1 tablet by mouth at bedtime. 30 tablet  3 09/26/2024     sacubitril-valsartan 24-26 MG tablet  Take 1 tablet by mouth every 12 hours. 60 tablet  2 09/26/2024     Metoprolol succinate 25 MG tablet XL  Take 1 tablet by mouth daily. 30 tablet  3 09/26/2024     Folic acid 1 MG tablet  Take 1 tablet by mouth daily. 30 tablet  3 09/26/2024     atorvastatin 80 MG tablet  Take 1 tablet by mouth at bedtime. 30 tablet  3 09/26/2024     aspirin 81 MG Chew Tab chewable tablet  Chew 1 tablet daily. 30 tablet    09/26/2024     Clopidogrel 75 MG tablet  Take 1 tablet by mouth daily. 30 tablet  3 09/26/2024     apixaban 5 MG tablet    Indications: L Ventricular Thrombus Post MI Take 1 tablet by mouth every 12 hours for 90 days, THEN 1 tablet every 12 hours. 60 tablet  3 09/26/2024 01/24/2025   amiodarone 400 MG tablet  Take 1 tablet by mouth daily. 30 tablet  5 09/26/2024     Ipratropium-albuterol 0.5-2.5 (3) MG/3ML nebulizer solution  Take 3 mL by nebulization every 6 hours as needed for Breathing Treatment. 100 mL    09/26/2024 10/22/2024                 Medication compliance: Yes   Uses pill box/organizer: Yes    Carries medication list: Yes     Blood Pressure Management  History of Hypertension: No   Medication Changes: No   Resting BP:  117/70    Heart Failure Management  Hx of Heart Failure: Yes;   Type (selection):     Most recent EF: 30%     Onset of heart failure diagnosis: 09/08/2024  Last heart failure hospitalization: 09/08/2024  Number of HF admissions per year: 1    Symptoms: Fatigue with exertion  Is there a family Hx of HF: No   Does patient obtain daily weight No       Heart Failure Reassessment:  Not seen here at [facility]    Smoking/Tobacco Assessment  Current Smoking history quit day of his event 09/08/2024      Other Core Component Plan    Goal Status: Initial Assessment; goals not yet started    Other Core Component Goals:   Increase knowledge test score from 10 out of 15 to 15 out of 15 by discharge.  Maintain Resting blood pressure of <130/80 while in the program.  Provide Cardiac book” Living Well with Heart Disease” and work book before patients 5th visit.  Initiate order for smoking Cessation if needed and begin Smoking Cessation Program.  Set tobacco cessation quit date while enrolled    Other Core Component Interventions/Education:   Monitor Blood pressure pre, during, and post workout.  Discuss knowledge quiz results within first 6 weeks of program.  Make sure patient is compliant with their medications.  Make sure patient continues to carry updated medication list and the importance of maintaining one.   Sent  request for Tobacco cessation counseling. 10/23  Patient is compliant with their medications and he carries updated medication list . 10/23  Current BP at 117/70. 10/23      Individual Patient Goals:    Gain Stamina  Get to my new normal    Goal Status: Initial Assessment; goals not yet started    Staff Comments:  Initial patient assessment. Patient oriented to the 10:00 am Class time. Patient will begin Rehab in October, 2024. Patient completed 6MW with 1472 ft. and 3.13 METS he had a steady gait. Patient reported no fatigue. He completed all required surveys.    Rehab Staff Signature: Lelia Coronado, CV,RRT, RCP

## 2024-10-28 ENCOUNTER — CLINICAL SUPPORT (OUTPATIENT)
Dept: CARDIAC REHAB | Facility: HOSPITAL | Age: 54
End: 2024-10-28
Payer: COMMERCIAL

## 2024-10-28 DIAGNOSIS — I21.3 ST ELEVATION MYOCARDIAL INFARCTION (STEMI), UNSPECIFIED ARTERY (MULTI): ICD-10-CM

## 2024-10-28 DIAGNOSIS — I46.9 SUDDEN CARDIAC ARREST: Primary | ICD-10-CM

## 2024-10-28 PROCEDURE — 94618 PULMONARY STRESS TESTING: CPT | Performed by: STUDENT IN AN ORGANIZED HEALTH CARE EDUCATION/TRAINING PROGRAM

## 2024-10-28 PROCEDURE — 93798 PHYS/QHP OP CAR RHAB W/ECG: CPT | Performed by: STUDENT IN AN ORGANIZED HEALTH CARE EDUCATION/TRAINING PROGRAM

## 2024-10-30 ENCOUNTER — CLINICAL SUPPORT (OUTPATIENT)
Dept: CARDIAC REHAB | Facility: HOSPITAL | Age: 54
End: 2024-10-30
Payer: COMMERCIAL

## 2024-10-30 DIAGNOSIS — I21.3 ST ELEVATION MYOCARDIAL INFARCTION (STEMI), UNSPECIFIED ARTERY (MULTI): Primary | ICD-10-CM

## 2024-10-30 DIAGNOSIS — I46.9 SUDDEN CARDIAC ARREST: ICD-10-CM

## 2024-10-30 PROCEDURE — 93798 PHYS/QHP OP CAR RHAB W/ECG: CPT | Performed by: STUDENT IN AN ORGANIZED HEALTH CARE EDUCATION/TRAINING PROGRAM

## 2024-11-01 ENCOUNTER — CLINICAL SUPPORT (OUTPATIENT)
Dept: CARDIAC REHAB | Facility: HOSPITAL | Age: 54
End: 2024-11-01
Payer: COMMERCIAL

## 2024-11-01 DIAGNOSIS — I46.9 SUDDEN CARDIAC ARREST: ICD-10-CM

## 2024-11-01 DIAGNOSIS — I21.3 ST ELEVATION MYOCARDIAL INFARCTION (STEMI), UNSPECIFIED ARTERY (MULTI): Primary | ICD-10-CM

## 2024-11-01 PROCEDURE — 93798 PHYS/QHP OP CAR RHAB W/ECG: CPT | Performed by: STUDENT IN AN ORGANIZED HEALTH CARE EDUCATION/TRAINING PROGRAM

## 2024-11-04 ENCOUNTER — APPOINTMENT (OUTPATIENT)
Dept: CARDIAC REHAB | Facility: HOSPITAL | Age: 54
End: 2024-11-04
Payer: COMMERCIAL

## 2024-11-04 ENCOUNTER — CLINICAL SUPPORT (OUTPATIENT)
Dept: CARDIAC REHAB | Facility: HOSPITAL | Age: 54
End: 2024-11-04
Payer: COMMERCIAL

## 2024-11-04 DIAGNOSIS — I46.9 SUDDEN CARDIAC ARREST: ICD-10-CM

## 2024-11-04 DIAGNOSIS — I21.3 ST ELEVATION MYOCARDIAL INFARCTION (STEMI), UNSPECIFIED ARTERY (MULTI): Primary | ICD-10-CM

## 2024-11-04 PROCEDURE — 93798 PHYS/QHP OP CAR RHAB W/ECG: CPT | Performed by: STUDENT IN AN ORGANIZED HEALTH CARE EDUCATION/TRAINING PROGRAM

## 2024-11-06 ENCOUNTER — CLINICAL SUPPORT (OUTPATIENT)
Dept: CARDIAC REHAB | Facility: HOSPITAL | Age: 54
End: 2024-11-06
Payer: COMMERCIAL

## 2024-11-06 DIAGNOSIS — I21.3 ST ELEVATION MYOCARDIAL INFARCTION (STEMI), UNSPECIFIED ARTERY (MULTI): Primary | ICD-10-CM

## 2024-11-06 DIAGNOSIS — I46.9 SUDDEN CARDIAC ARREST: ICD-10-CM

## 2024-11-06 PROCEDURE — 93798 PHYS/QHP OP CAR RHAB W/ECG: CPT | Performed by: STUDENT IN AN ORGANIZED HEALTH CARE EDUCATION/TRAINING PROGRAM

## 2024-11-08 ENCOUNTER — CLINICAL SUPPORT (OUTPATIENT)
Dept: CARDIAC REHAB | Facility: HOSPITAL | Age: 54
End: 2024-11-08
Payer: COMMERCIAL

## 2024-11-08 DIAGNOSIS — I46.9 SUDDEN CARDIAC ARREST: ICD-10-CM

## 2024-11-08 DIAGNOSIS — I21.3 ST ELEVATION MYOCARDIAL INFARCTION (STEMI), UNSPECIFIED ARTERY (MULTI): Primary | ICD-10-CM

## 2024-11-08 PROCEDURE — 93798 PHYS/QHP OP CAR RHAB W/ECG: CPT | Performed by: STUDENT IN AN ORGANIZED HEALTH CARE EDUCATION/TRAINING PROGRAM

## 2024-11-11 ENCOUNTER — CLINICAL SUPPORT (OUTPATIENT)
Dept: CARDIAC REHAB | Facility: HOSPITAL | Age: 54
End: 2024-11-11
Payer: COMMERCIAL

## 2024-11-11 DIAGNOSIS — I21.3 ST ELEVATION MYOCARDIAL INFARCTION (STEMI), UNSPECIFIED ARTERY (MULTI): Primary | ICD-10-CM

## 2024-11-11 DIAGNOSIS — I46.9 SUDDEN CARDIAC ARREST: ICD-10-CM

## 2024-11-11 PROCEDURE — 93798 PHYS/QHP OP CAR RHAB W/ECG: CPT | Performed by: STUDENT IN AN ORGANIZED HEALTH CARE EDUCATION/TRAINING PROGRAM

## 2024-11-12 DIAGNOSIS — I50.21 ACUTE SYSTOLIC (CONGESTIVE) HEART FAILURE: Primary | ICD-10-CM

## 2024-11-13 ENCOUNTER — CLINICAL SUPPORT (OUTPATIENT)
Dept: CARDIAC REHAB | Facility: HOSPITAL | Age: 54
End: 2024-11-13
Payer: COMMERCIAL

## 2024-11-13 DIAGNOSIS — I21.3 ST ELEVATION MYOCARDIAL INFARCTION (STEMI), UNSPECIFIED ARTERY (MULTI): Primary | ICD-10-CM

## 2024-11-13 DIAGNOSIS — I46.9 SUDDEN CARDIAC ARREST: ICD-10-CM

## 2024-11-13 PROCEDURE — 93798 PHYS/QHP OP CAR RHAB W/ECG: CPT | Performed by: STUDENT IN AN ORGANIZED HEALTH CARE EDUCATION/TRAINING PROGRAM

## 2024-11-15 ENCOUNTER — CLINICAL SUPPORT (OUTPATIENT)
Dept: CARDIAC REHAB | Facility: HOSPITAL | Age: 54
End: 2024-11-15
Payer: COMMERCIAL

## 2024-11-15 DIAGNOSIS — I21.3 ST ELEVATION MYOCARDIAL INFARCTION (STEMI), UNSPECIFIED ARTERY (MULTI): Primary | ICD-10-CM

## 2024-11-15 DIAGNOSIS — I46.9 SUDDEN CARDIAC ARREST: ICD-10-CM

## 2024-11-15 PROCEDURE — 93798 PHYS/QHP OP CAR RHAB W/ECG: CPT | Performed by: STUDENT IN AN ORGANIZED HEALTH CARE EDUCATION/TRAINING PROGRAM

## 2024-11-18 ENCOUNTER — CLINICAL SUPPORT (OUTPATIENT)
Dept: CARDIAC REHAB | Facility: HOSPITAL | Age: 54
End: 2024-11-18
Payer: COMMERCIAL

## 2024-11-18 DIAGNOSIS — I46.9 SUDDEN CARDIAC ARREST: ICD-10-CM

## 2024-11-18 DIAGNOSIS — I21.3 ST ELEVATION MYOCARDIAL INFARCTION (STEMI), UNSPECIFIED ARTERY (MULTI): Primary | ICD-10-CM

## 2024-11-18 PROCEDURE — 93798 PHYS/QHP OP CAR RHAB W/ECG: CPT | Performed by: STUDENT IN AN ORGANIZED HEALTH CARE EDUCATION/TRAINING PROGRAM

## 2024-11-18 NOTE — PROGRESS NOTES
Cardiac Rehabilitation 30 Day Assessment    Name: Christopher D Hosler  Medical Record Number: [id number]  YOB: 1970  Age: 54 y.o.    Today's Date: 11/15/2024  Primary Care Physician: Dipika Lopez MD  Referring Physician: Cristi Joseph MD  Program Location: [address]   Exercise Start Date: 10/28/2024    General  Primary Diagnosis:   1. ST elevation myocardial infarction (STEMI), unspecified artery (Multi)  Referral to Cardiac Rehab      2. Cardiac arrest  Referral to Cardiac Rehab         Onset/Date of Diagnosis: 9/8/2024    Initial Assessment, not yet started program.    AACVPR Risk Stratification: Moderate    Falls Risk: Low  Psychosocial Assessment     Pre PHQ-9: 3    Post PHQ-9:  Sent PH-Q 9 to MD if score > 20: NO; score <20      Pt reported/currently experiencing stress: No  Patient uses stress management skills: Yes   History of: no history of anxiety or depression  Currently seeing a mental health provider: No  Social Support: Yes, Whom:Spouse  Quality of Life Survey: SF-36   SF-36 Pre Post   Physical Component Score 36.17 TBD   Mental Component Score 55.92 TBD     Learning Assessment:  Learning assessment/barriers: None  Preferred learning method: ALL  Barriers: None  Comments: NONE    Stages of Change:Action    Psychosocial Plan    Goal Status: In Progress    Psychosocial Goals:   1. Maintain or decrease PHQ-9 score of 3 by discharge.  2. Improve SF36 scores by discharge. Will retake prior to discharge.  3. Question patient on new or current psychological issues at least every 30 days.  4. Educate patient on Stress Management skills and apply them to reported stressors while in the program.    Psychosocial Interventions/Education:   Provide one on one emotional support as needed.   Current PQ9 score at 3. 10/23  Patient reports current Emotional and Stress level at 4-5. 10-23  Quiz 6 11/6 Your Emotional Health no questions not completed.   HO Stress Management Overview  "8 Tips / Steps discussed verbalized understanding.  HO Stress Less for a Healthier Heart.  Questioned patient on new or current psychological issues none to repost at this time. 11/15    Nutrition Assessment:    Hyperlipidemia: Only see High Triglycerides on patients chart.    No results found in chart 10/23/2024  Lipids:   No results found for: \"CHOL\", \"HDL\", \"LDLF\", \"TRIG\"    Current Dietary Guidelines: PYP scores 48/96 Unlikely to meet current recommendations for good health and has room for improvement.  Barriers to dietary change: no    Diet Habit Survey: Picture Your Plate  Pre: 48/96  Post: To be done at discharge.    Diabetes Assessment    No results found for: \"HGBA1C\"    History of Diabetes: No    Weight Management    Height: 70\"  Weight LBS: PRE: 147   POST:  Change:   BMI (Calculated): 21.1  Waist Circumference: PRE: 33\"  POST:  LOST:    Nutrition Plan    Goal Status: In Progress    Nutrition Goals:   Maintain or Improve your \"Picture Your Plate Score\" from  48 to 60 or greater by discharge.  Lose 1 inch from waist by discharge ( 33 inches on admit).  Provide education on nutritional aspects related to cardiac health and discuss dietary educational topics while in the program.  Educate patient on their admitting Diagnosis of STEMI / Cardiac Arrest , and their medical HX and how they are relevant to their health.    Patient has a HX of STEMI, CAD, Acute systolic heart failure.    Nutrition Interventions/Education:   Discuss \"Picture Your Plate Score\" within the first 6 weeks of program  Provided HO overview of admitting diagnosis. Discussed and verbalized understanding.  Quiz 2 11/6 Nutrition no questions not completed.     Discussed \"Picture Your Plate Scores\" and ways to improve scores. Verbalized understanding.   HO Mediterranean Diet Overview, What and how much to Eat, Meals and Snacking, Resources to more information, and Shopping Ideas. Discussed and verbalized understanding.   HO Tasting The " Mediterranean Diet 8 Simple Steps discussed verbalized understanding.  HO Heart Healthy Eating on a Budget. Discussed and verbalized understanding.  HO Building a Heart Healthy Plate. Discussed and verbalized understanding.   Quiz 5 11/6 Managing specific risk factors High cholesterol ,Blood Pressure and Diabetes no questions not completed.  HO Overview on Cholesterol discussed and verbalized understanding .  HO Blood Pressure, Herbs and Spices instead of Salt discussed verbalized understanding.  HO Consequences of High Blood Pressure discussed verbalized understanding.  HO Understanding Diabetes, Take Diabetes to Heart discussed verbalized understanding.   HO Diabetes Know your numbers discussed and verbalized understanding.  HO ADA Food for Thought discussed verbalized understanding.  HO High Cholesterol Overview discussed verbalized understanding.  HO How to control Cholesterol discussed verbalized understanding.  HO How do my cholesterol levels affect my risk of heart attack and stroke discussed verbalized understanding.  HO Cholesterol know your numbers discussed verbalized understanding.     Exercise Assessment    Home Exercise: Yes  Walking  Mode: Aerobic  Frequency: 3xWeek  Duration: 20+ minutes    Exercise Prescription     Exercise Prescription based on: 6 Minute Walk Test     DASI: TL SCORE: 24.2     MET Score:  5.7    6MWT: Yes  Pre Test O2 Sat/HR: 97/52  6 Minute O2 Sat/HR: 98/59  Distance Walked(ft): 1472  Greer Dyspnea: 0  Greer PRE: 0  Post Test O2 Sat/HR: 99/51  Adverse Reactions: none      Frequency:  3 days/week   Mode: Aerobic   Duration: >30 total aerobic minutes   Intensity: RPE <15  Target HR:  Rest+30  MET Level: PRE: 3.13   POST:  Patient wears supplemental O2: No  Current Max exercise Mets: 6.8     Modality METs Workload LVL Duration (minutes)   1 Pre-Exercise       2 Rest     3 :00   3 NuStep 4.4 72 RICH[address]ON  3 12 :00   4 Recumbent Bike 6.8 58 R[address]ON  2 12 :00   5 Treadmill 3.1 2.7 MPH   12 :00    6 Weights    5:00   7 Final Cool Down    3:00   8 Post-Exercise         Maintain Heart Rate at 80-90% of Maximum for patients age 133-150   Resistance Training: Yes to start on patient 9th visit.  Home Exercise Prescription given: To be given prior to discharge from program.    Exercise Plan    Goal Status: In Progress    Exercise Goals:   Improve post 6MW by discharge.  Increase Mets to 5.0 by the end of the program. Achieved 11/8  Start exercise program at home second week into the program.  Gain independence and confidence with exercise while in the program.   Have a plan for continuing exercise after completion of program.  New Mets goal 10.0 by end of program.    Exercise Interventions/Education:   What exactly are METs handout provided and discussed Verbalized understanding. 10/23  OBDULIO exertion handout provided and discussed patient verbalized understanding. 10/23  Increase METs by 1.0 every weeks or as tolerated.  Patient is not doing any home exercises. We had a discussion on the importance of exercising at home.   Quiz 3 11/6 Exercising More no questions not completed.  HO Aerobic Exercising Overview discussed verbalized understanding.  HO Real - Life Benefits of Exercise and Physical Activity discussed verbalized understanding.  HO Drinking Enough Fluids discussed verbalized understanding.  HO 4 Types of Exercises to improve your discussed verbalized understanding  HO [facility] Warm Ups/Cool downs and Strength Training Exercises that are displayed in class.  Achieved first Mets goal of 5. 11/8  Current Mets at 6.8. 11/15/2024   Patient currently exercising at home walking 3 x week for 20+ minutes per day. 11/15    Other Core Components/Risk Factor Assessment:    Medication adherence  Current Medications:        Start Date End Date    amiodarone 400 MG tablet  Take 1 tablet by mouth daily. 30 tablet  5   apixaban 5 MG tablet   Indications: L Ventricular Thrombus Post MI Take 1 tablet by mouth every 12  hours for 90 days, THEN 1 tablet every 12 hours. 60 tablet  3   aspirin 81 MG Chew Tab chewable tablet  Chew 1 tablet daily. 30 tablet      atorvastatin 80 MG tablet  Take 1 tablet by mouth at bedtime. 30 tablet  3   Clopidogrel 75 MG tablet  Take 1 tablet by mouth daily. 30 tablet  3   Folic acid 1 MG tablet  Take 1 tablet by mouth daily. 30 tablet  3   Melatonin 3 MG tablet  Take 1 tablet by mouth at bedtime. 30 tablet  3   Metoprolol succinate 25 MG tablet XL  Take 1 tablet by mouth daily. 30 tablet  3   Ondansetron 4 MG tablet  Take 1 tablet by mouth every 6 hours as needed for Nausea / Vomiting. 30 tablet      Pantoprazole 40 MG Tab DR tablet    Indications: Inpt Stress Ulcer Prophylaxis Take 1 tablet by mouth daily. 30 tablet  2   sacubitril-valsartan 24-26 MG tablet  Take 1 tablet by mouth every 12 hours. 60 tablet  2   Senna 8.6 MG tablet  Take 1 tablet by mouth daily as needed. 30 tablet  2   triamcinolone 0.1 % Ointment ointment  Apply to rash       Ipratropium-albuterol 0.5-2.5 (3) MG/3ML nebulizer solution  Take 3 mL by nebulization every 6 hours as needed for Breathing Treatment. 100 mL        Prescription Sig Dispensed Refills   Senna 8.6 MG tablet  Take 1 tablet by mouth daily as needed. 30 tablet  2   Pantoprazole 40 MG Tab DR tablet    Indications: Inpt Stress Ulcer Prophylaxis Take 1 tablet by mouth daily. 30 tablet  2   Ondansetron 4 MG tablet  Take 1 tablet by mouth every 6 hours as needed for Nausea / Vomiting. 30 tablet      Melatonin 3 MG tablet  Take 1 tablet by mouth at bedtime. 30 tablet  3   sacubitril-valsartan 24-26 MG tablet  Take 1 tablet by mouth every 12 hours. 60 tablet  2   Metoprolol succinate 25 MG tablet XL  Take 1 tablet by mouth daily. 30 tablet  3   Folic acid 1 MG tablet  Take 1 tablet by mouth daily. 30 tablet  3   atorvastatin 80 MG tablet  Take 1 tablet by mouth at bedtime. 30 tablet  3   aspirin 81 MG Chew Tab chewable tablet  Chew 1 tablet daily. 30 tablet       Clopidogrel 75 MG tablet  Take 1 tablet by mouth daily. 30 tablet  3   apixaban 5 MG tablet   Indications: L Ventricular Thrombus Post MI Take 1 tablet by mouth every 12 hours for 90 days, THEN 1 tablet every 12 hours. 60 tablet  3   amiodarone 400 MG tablet  Take 1 tablet by mouth daily. 30 tablet  5   Ipratropium-albuterol 0.5-2.5 (3) MG/3ML nebulizer solution  Take 3 mL by nebulization every 6 hours as needed for Breathing Treatment. 100 mL                    Medication compliance: Yes   Uses pill box/organizer: Yes    Carries medication list: Yes     Blood Pressure Management  History of Hypertension: No   Medication Changes: No   Resting BP:  117/70    Heart Failure Management  Hx of Heart Failure: Yes;   Type (selection):   Most recent EF: 30%     Onset of heart failure diagnosis: 09/08/2024  Last heart failure hospitalization: 09/08/2024  Number of HF admissions per year: 1    Symptoms: Fatigue with exertion  Is there a family Hx of HF: No   Does patient obtain daily weight No       Heart Failure Reassessment: Not seen here at [facility]    Smoking/Tobacco Assessment  Current Smoking history quit day of his event 09/08/2024      Other Core Component Plan    Goal Status: In Progress    Other Core Component Goals:   Increase knowledge test score from 10 out of 15 to 15 out of 15 by discharge.  Maintain Resting blood pressure of <130/80 while in the program.  Provide Cardiac book” Living Well with Heart Disease” and work book before patients 5th visit.  Initiate order for smoking Cessation if needed and begin Smoking Cessation Program.  Set tobacco cessation quit date while enrolled    Other Core Component Interventions/Education:   Monitor Blood pressure pre, during, and post workout.  Discuss knowledge quiz results within first 6 weeks of program.  Make sure patient is compliant with their medications.  Make sure patient continues to carry updated medication list and the importance of maintaining one.   Sent  request for Tobacco cessation counseling. 10/23  Patient is compliant with their medications and he carries updated medication list. 10/23  Current BP at 117/70. 10/23  Provided Cardiac book” Living Well with Heart Disease” and work book and explained how they will be used verbalized understanding.  Discussed knowledge quiz results verbalized understanding.   Quiz 1 11/6 Rehab and Strokes work no questions completed. Scored 100%  HO Overview of admitting diagnosis. Discussed verbalized understanding.  Quiz 4 11/6 Medications no questions not completed.  HO Medications After Heart Attack (The Basics) discussed verbalized understanding.  HO Tips on Taking Medication discussed verbalized understanding.  HO How medicines help prevent heart attacks discussed verbalized understanding.  Current BP at 101/64. 11/15     Individual Patient Goals:    Gain Stamina  Get to my new normal    Goal Status: In Progress    Staff Comments:  30 Day Assessment. Patient has completed 9 sessions. Patient is working hard in Rehab. Patient has achieved his first Mets goal of 5.0. He is working towards his new set MET Goal of 10.0 current METs at 6.8 it will be increased as tolerated. He states he has had a 20% improvement in his strength and endurance since starting in Rehab. Patients' cardiac monitor has maintained in Normal sinus rhythm throughout exercise. He has learned that it is important to watch your diet. He has no questions or concerns at this time.    Rehab Staff Signature: Lelia Coronado, CV, RRT, RCP

## 2024-11-20 ENCOUNTER — CLINICAL SUPPORT (OUTPATIENT)
Dept: CARDIAC REHAB | Facility: HOSPITAL | Age: 54
End: 2024-11-20
Payer: COMMERCIAL

## 2024-11-20 DIAGNOSIS — I21.3 ST ELEVATION MYOCARDIAL INFARCTION (STEMI), UNSPECIFIED ARTERY (MULTI): Primary | ICD-10-CM

## 2024-11-20 DIAGNOSIS — I46.9 SUDDEN CARDIAC ARREST: ICD-10-CM

## 2024-11-20 PROCEDURE — 93798 PHYS/QHP OP CAR RHAB W/ECG: CPT | Performed by: STUDENT IN AN ORGANIZED HEALTH CARE EDUCATION/TRAINING PROGRAM

## 2024-11-20 NOTE — PROGRESS NOTES
Spoke to patient regarding smoking cessation. Patient reports he has not smoked cigarettes since heart event on September 8, 2024. Patient is maintaining free of cigarettes and no issues with maintaining staying quit. Patient does report he uses 2 joints of marijuana a day for pain. Patient does not wish for any smoking cessation at this time.

## 2024-11-22 ENCOUNTER — CLINICAL SUPPORT (OUTPATIENT)
Dept: CARDIAC REHAB | Facility: HOSPITAL | Age: 54
End: 2024-11-22
Payer: COMMERCIAL

## 2024-11-22 DIAGNOSIS — I21.3 ST ELEVATION MYOCARDIAL INFARCTION (STEMI), UNSPECIFIED ARTERY (MULTI): Primary | ICD-10-CM

## 2024-11-22 DIAGNOSIS — I46.9 SUDDEN CARDIAC ARREST: ICD-10-CM

## 2024-11-22 PROCEDURE — 93798 PHYS/QHP OP CAR RHAB W/ECG: CPT | Performed by: STUDENT IN AN ORGANIZED HEALTH CARE EDUCATION/TRAINING PROGRAM

## 2024-11-25 ENCOUNTER — CLINICAL SUPPORT (OUTPATIENT)
Dept: CARDIAC REHAB | Facility: HOSPITAL | Age: 54
End: 2024-11-25
Payer: COMMERCIAL

## 2024-11-25 DIAGNOSIS — I21.3 ST ELEVATION MYOCARDIAL INFARCTION (STEMI), UNSPECIFIED ARTERY (MULTI): Primary | ICD-10-CM

## 2024-11-25 DIAGNOSIS — I46.9 SUDDEN CARDIAC ARREST: ICD-10-CM

## 2024-11-25 PROCEDURE — 93798 PHYS/QHP OP CAR RHAB W/ECG: CPT | Performed by: STUDENT IN AN ORGANIZED HEALTH CARE EDUCATION/TRAINING PROGRAM

## 2024-11-27 ENCOUNTER — CLINICAL SUPPORT (OUTPATIENT)
Dept: CARDIAC REHAB | Facility: HOSPITAL | Age: 54
End: 2024-11-27
Payer: COMMERCIAL

## 2024-11-27 DIAGNOSIS — I21.3 ST ELEVATION MYOCARDIAL INFARCTION (STEMI), UNSPECIFIED ARTERY (MULTI): Primary | ICD-10-CM

## 2024-11-27 DIAGNOSIS — I46.9 SUDDEN CARDIAC ARREST: ICD-10-CM

## 2024-11-27 PROCEDURE — 93798 PHYS/QHP OP CAR RHAB W/ECG: CPT | Performed by: STUDENT IN AN ORGANIZED HEALTH CARE EDUCATION/TRAINING PROGRAM

## 2024-11-29 ENCOUNTER — CLINICAL SUPPORT (OUTPATIENT)
Dept: CARDIAC REHAB | Facility: HOSPITAL | Age: 54
End: 2024-11-29
Payer: COMMERCIAL

## 2024-11-29 DIAGNOSIS — I21.3 ST ELEVATION MYOCARDIAL INFARCTION (STEMI), UNSPECIFIED ARTERY (MULTI): Primary | ICD-10-CM

## 2024-11-29 DIAGNOSIS — I46.9 SUDDEN CARDIAC ARREST: ICD-10-CM

## 2024-11-29 PROCEDURE — 93798 PHYS/QHP OP CAR RHAB W/ECG: CPT | Performed by: STUDENT IN AN ORGANIZED HEALTH CARE EDUCATION/TRAINING PROGRAM

## 2024-12-02 ENCOUNTER — CLINICAL SUPPORT (OUTPATIENT)
Dept: CARDIAC REHAB | Facility: HOSPITAL | Age: 54
End: 2024-12-02
Payer: COMMERCIAL

## 2024-12-02 DIAGNOSIS — I21.3 ST ELEVATION MYOCARDIAL INFARCTION (STEMI), UNSPECIFIED ARTERY (MULTI): Primary | ICD-10-CM

## 2024-12-02 DIAGNOSIS — I46.9 SUDDEN CARDIAC ARREST: ICD-10-CM

## 2024-12-02 PROCEDURE — 93798 PHYS/QHP OP CAR RHAB W/ECG: CPT | Performed by: STUDENT IN AN ORGANIZED HEALTH CARE EDUCATION/TRAINING PROGRAM

## 2024-12-04 ENCOUNTER — CLINICAL SUPPORT (OUTPATIENT)
Dept: CARDIAC REHAB | Facility: HOSPITAL | Age: 54
End: 2024-12-04
Payer: COMMERCIAL

## 2024-12-04 DIAGNOSIS — I46.9 SUDDEN CARDIAC ARREST: ICD-10-CM

## 2024-12-04 DIAGNOSIS — I21.3 ST ELEVATION MYOCARDIAL INFARCTION (STEMI), UNSPECIFIED ARTERY (MULTI): Primary | ICD-10-CM

## 2024-12-04 PROCEDURE — 93798 PHYS/QHP OP CAR RHAB W/ECG: CPT | Performed by: STUDENT IN AN ORGANIZED HEALTH CARE EDUCATION/TRAINING PROGRAM

## 2024-12-06 ENCOUNTER — APPOINTMENT (OUTPATIENT)
Dept: CARDIAC REHAB | Facility: HOSPITAL | Age: 54
End: 2024-12-06
Payer: COMMERCIAL

## 2024-12-09 ENCOUNTER — CLINICAL SUPPORT (OUTPATIENT)
Dept: CARDIAC REHAB | Facility: HOSPITAL | Age: 54
End: 2024-12-09
Payer: COMMERCIAL

## 2024-12-09 DIAGNOSIS — I46.9 SUDDEN CARDIAC ARREST: ICD-10-CM

## 2024-12-09 DIAGNOSIS — I21.3 ST ELEVATION MYOCARDIAL INFARCTION (STEMI), UNSPECIFIED ARTERY (MULTI): Primary | ICD-10-CM

## 2024-12-09 PROCEDURE — 93798 PHYS/QHP OP CAR RHAB W/ECG: CPT | Performed by: STUDENT IN AN ORGANIZED HEALTH CARE EDUCATION/TRAINING PROGRAM

## 2024-12-11 ENCOUNTER — APPOINTMENT (OUTPATIENT)
Dept: CARDIAC REHAB | Facility: HOSPITAL | Age: 54
End: 2024-12-11
Payer: COMMERCIAL

## 2024-12-13 ENCOUNTER — CLINICAL SUPPORT (OUTPATIENT)
Dept: CARDIAC REHAB | Facility: HOSPITAL | Age: 54
End: 2024-12-13
Payer: COMMERCIAL

## 2024-12-13 VITALS — DIASTOLIC BLOOD PRESSURE: 67 MMHG | SYSTOLIC BLOOD PRESSURE: 114 MMHG

## 2024-12-13 DIAGNOSIS — I46.9 SUDDEN CARDIAC ARREST: ICD-10-CM

## 2024-12-13 DIAGNOSIS — I21.3 ST ELEVATION MYOCARDIAL INFARCTION (STEMI), UNSPECIFIED ARTERY (MULTI): Primary | ICD-10-CM

## 2024-12-13 PROCEDURE — 93798 PHYS/QHP OP CAR RHAB W/ECG: CPT | Performed by: STUDENT IN AN ORGANIZED HEALTH CARE EDUCATION/TRAINING PROGRAM

## 2024-12-13 NOTE — PROGRESS NOTES
Cardiac Rehabilitation 60 Day Assessment    Name: Christopher D Hosler  Medical Record Number: [id number]  YOB: 1970  Age: 54 y.o.    Today's Date: 12/11/2024  Primary Care Physician: Dipika Lopez MD  Referring Physician: Cristi Joseph MD  Program Location: [address]   Exercise Start Date: 10/28/2024    General  Primary Diagnosis:   1. ST elevation myocardial infarction (STEMI), unspecified artery (Multi)  Referral to Cardiac Rehab      2. Cardiac arrest  Referral to Cardiac Rehab         Onset/Date of Diagnosis: 9/8/2024    Session #: 19    AACVPR Risk Stratification: Moderate    Falls Risk: Low  Psychosocial Assessment     Pre PHQ-9: 3    Post PHQ-9:  Sent PH-Q 9 to MD if score > 20: NO; score <20      Pt reported/currently experiencing stress: No  Patient uses stress management skills: Yes   History of: no history of anxiety or depression  Currently seeing a mental health provider: No  Social Support: Yes, Whom:Spouse  Quality of Life Survey: SF-36   SF-36 Pre Post   Physical Component Score 36.17 TBD   Mental Component Score 55.92 TBD     Learning Assessment:  Learning assessment/barriers: None  Preferred learning method: ALL  Barriers: None  Comments: NONE    Stages of Change :Action    Psychosocial Plan    Goal Status: In Progress    Psychosocial Goals:   1. Maintain or decrease PHQ-9 score of 3 by discharge.  2. Improve SF36 scores by discharge. Will retake prior to discharge.  3. Question patient on new or current psychological issues at least every 30 days.  4. Educate patient on Stress Management skills and apply them to reported stressors while in the program.    Psychosocial Interventions/Education:   Provide one on one emotional support as needed.   Current PQ9 score at 3. 10/23  Patient reports current Emotional and Stress level at 4-5. 10-23  Quiz 6 11/6 Your Emotional Health no questions not completed.   HO Stress Management Overview 8 Tips / Steps discussed  "verbalized understanding.  HO Stress Less for a Healthier Heart.  Questioned patient on new or current psychological issues none to repost at this time. 11/15  Current stress level at 1. 12/11  Questioned patient on new or current psychological issues none to report at this time. 12/11    Nutrition Assessment:    Hyperlipidemia: Only see High Triglycerides on patients chart.    No results found in chart 10/23/2024  Lipids:   No results found for: \"CHOL\", \"HDL\", \"LDLF\", \"TRIG\"    Current Dietary Guidelines: PYP scores 48/96 Unlikely to meet current recommendations for good health and has room for improvement.  Barriers to dietary change: no    Diet Habit Survey: Picture Your Plate  Pre: 48/96  Post: To be done at discharge.    Diabetes Assessment    No results found for: \"HGBA1C\"    History of Diabetes: No    Weight Management    Height: 70\"  Weight LBS: PRE: 147   POST:  Change:   BMI (Calculated): 21.1  Waist Circumference: PRE: 33\"  POST:  LOST:    Nutrition Plan    Goal Status: In Progress    Nutrition Goals:   Maintain or Improve your \"Picture Your Plate Score\" from  48 to 60 or greater by discharge.  Lose 1 inch from waist by discharge ( 33 inches on admit).  Provide education on nutritional aspects related to cardiac health and discuss dietary educational topics while in the program.  Educate patient on their admitting Diagnosis of STEMI / Cardiac Arrest , and their medical HX and how they are relevant to their health.    Patient has a HX of STEMI, CAD, Acute systolic heart failure.    Nutrition Interventions/Education:   Discuss \"Picture Your Plate Score\" within the first 6 weeks of program  Provided HO overview of admitting diagnosis. Discussed and verbalized understanding.  Quiz 2 11/6 Nutrition no questions not completed.     Discussed \"Picture Your Plate Scores\" and ways to improve scores. Verbalized understanding.   HO Mediterranean Diet Overview, What and how much to Eat, Meals and Snacking, Resources " to more information, and Shopping Ideas. Discussed and verbalized understanding.   HO Tasting The Mediterranean Diet 8 Simple Steps discussed verbalized understanding.  HO Heart Healthy Eating on a Budget. Discussed and verbalized understanding.  HO Building a Heart Healthy Plate. Discussed and verbalized understanding.   Quiz 5 11/6 Managing specific risk factors High cholesterol, Blood Pressure and Diabetes no questions not completed.  HO Overview on Cholesterol discussed and verbalized understanding.  HO Blood Pressure, Herbs and Spices instead of Salt discussed verbalized understanding.  HO Consequences of High Blood Pressure discussed verbalized understanding.  HO Understanding Diabetes, Take Diabetes to Heart discussed verbalized understanding.   HO Diabetes Know your numbers discussed and verbalized understanding.  HO ADA Food for Thought discussed verbalized understanding.  HO High Cholesterol Overview discussed verbalized understanding.  HO How to control Cholesterol discussed verbalized understanding.  HO How do my cholesterol levels affect my risk of heart attack and stroke discussed verbalized understanding.  HO Cholesterol know your numbers discussed verbalized understanding.     Exercise Assessment    Home Exercise: Yes / Walking  Mode: Aerobic  Frequency: 2xWeek  Duration: 30+ minutes    Exercise Prescription     Exercise Prescription based on: 6 Minute Walk Test     DASI: TL SCORE: 24.2     MET Score:  5.7    6MWT: Yes  Pre Test O2 Sat/HR: 97/52  6 Minute O2 Sat/HR: 98/59  Distance Walked(ft): 1472  Greer Dyspnea: 0  Greer PRE: 0  Post Test O2 Sat/HR: 99/51  Adverse Reactions: none      Frequency:  3 days/week   Mode: Aerobic   Duration: >30 total aerobic minutes   Intensity: RPE <15  Target HR:  Rest+30  MET Level: PRE: 3.13   POST:  Patient wears supplemental O2: No  Current Max exercise Mets: 9.4     Modality METs Workload LVL Duration (minutes)   1 Pre-Exercise       2 Rest     3 :00   3 NuStep 5.0  94 RICHARDSON  5 12 :00   4 Recumbent Bike 9.4 84 RICHARDSON  5 12 :00   5 Treadmill 4.1 2.9 MPH  2% 12 :00   6 Weights 4.5 5 LBS 10 5:00   7 Final Cool Down    3:00   8 Post-Exercise         Maintain Heart Rate at 80-90% of Maximum for patients age 133-150   Resistance Training: Yes to started on patient 9th visit.  Home Exercise Prescription given: To be given prior to discharge from program.    Exercise Plan    Goal Status: In Progress    Exercise Goals:   Improve post 6MW by discharge.  Increase Mets to 5.0 by the end of the program. Achieved 11/8  Start exercise program at home second week into the program.  Gain independence and confidence with exercise while in the program.   Have a plan for continuing exercise after completion of program.  New Mets goal 10.0 by end of program.    Exercise Interventions/Education:   What exactly are METs handout provided and discussed Verbalized understanding. 10/23  OBDULIO exertion handout provided and discussed patient verbalized understanding. 10/23  Increase METs by 1.0 every weeks or as tolerated.  Patient is not doing any home exercises. We had a discussion on the importance of exercising at home.   Quiz 3 11/6 Exercising More no questions not completed.  HO Aerobic Exercising Overview discussed verbalized understanding.  HO Real - Life Benefits of Exercise and Physical Activity discussed verbalized understanding.  HO Drinking Enough Fluids discussed verbalized understanding.  HO 4 Types of Exercises to improve your discussed verbalized understanding  HO [facility] Warm Ups/Cool downs and Strength Training Exercises that are displayed in class.  Achieved first Mets goal of 5. 11/8  Current Mets at 6.8. 11/15/2024   Patient currently exercising at home walking 3 x week for 20+ minutes per day. 11/15  Current Mets at 9.4. 12/11  Patient currently exercising at home walking 2 x week for 30+ minutes per day. 12/11    Other Core Components/Risk Factor Assessment:    Medication  adherence  Current Medications:        Start Date End Date    amiodarone 400 MG tablet  Take 1 tablet by mouth daily. 30 tablet  5   apixaban 5 MG tablet   Indications: L Ventricular Thrombus Post MI Take 1 tablet by mouth every 12 hours for 90 days, THEN 1 tablet every 12 hours. 60 tablet  3   aspirin 81 MG Chew Tab chewable tablet  Chew 1 tablet daily. 30 tablet      atorvastatin 80 MG tablet  Take 1 tablet by mouth at bedtime. 30 tablet  3   Clopidogrel 75 MG tablet  Take 1 tablet by mouth daily. 30 tablet  3   Folic acid 1 MG tablet  Take 1 tablet by mouth daily. 30 tablet  3   Melatonin 3 MG tablet  Take 1 tablet by mouth at bedtime. 30 tablet  3   Metoprolol succinate 25 MG tablet XL  Take 1 tablet by mouth daily. 30 tablet  3   Ondansetron 4 MG tablet  Take 1 tablet by mouth every 6 hours as needed for Nausea / Vomiting. 30 tablet      Pantoprazole 40 MG Tab DR tablet DR   Indications: Inpt Stress Ulcer Prophylaxis Take 1 tablet by mouth daily. 30 tablet  2   sacubitril-valsartan 24-26 MG tablet  Take 1 tablet by mouth every 12 hours. 60 tablet  2   Senna 8.6 MG tablet  Take 1 tablet by mouth daily as needed. 30 tablet  2   triamcinolone 0.1 % Ointment ointment  Apply to rash       Ipratropium-albuterol 0.5-2.5 (3) MG/3ML nebulizer solution  Take 3 mL by nebulization every 6 hours as needed for Breathing Treatment. 100 mL        Prescription Sig Dispensed Refills   Senna 8.6 MG tablet  Take 1 tablet by mouth daily as needed. 30 tablet  2   Pantoprazole 40 MG Tab DR tablet DR   Indications: Inpt Stress Ulcer Prophylaxis Take 1 tablet by mouth daily. 30 tablet  2   Ondansetron 4 MG tablet  Take 1 tablet by mouth every 6 hours as needed for Nausea / Vomiting. 30 tablet      Melatonin 3 MG tablet  Take 1 tablet by mouth at bedtime. 30 tablet  3   sacubitril-valsartan 24-26 MG tablet  Take 1 tablet by mouth every 12 hours. 60 tablet  2   Metoprolol succinate 25 MG tablet XL  Take 1 tablet by mouth daily. 30 tablet   3   Folic acid 1 MG tablet  Take 1 tablet by mouth daily. 30 tablet  3   atorvastatin 80 MG tablet  Take 1 tablet by mouth at bedtime. 30 tablet  3   aspirin 81 MG Chew Tab chewable tablet  Chew 1 tablet daily. 30 tablet      Clopidogrel 75 MG tablet  Take 1 tablet by mouth daily. 30 tablet  3   apixaban 5 MG tablet   Indications: L Ventricular Thrombus Post MI Take 1 tablet by mouth every 12 hours for 90 days, THEN 1 tablet every 12 hours. 60 tablet  3   amiodarone 400 MG tablet  Take 1 tablet by mouth daily. 30 tablet  5   Ipratropium-albuterol 0.5-2.5 (3) MG/3ML nebulizer solution  Take 3 mL by nebulization every 6 hours as needed for Breathing Treatment. 100 mL                    Medication compliance: Yes   Uses pill box/organizer: Yes    Carries medication list: Yes     Blood Pressure Management  History of Hypertension: No   Medication Changes: No   Resting BP:  117/70    Heart Failure Management  Hx of Heart Failure: Yes;   Type (selection):   Most recent EF: 30%     Onset of heart failure diagnosis: 09/08/2024  Last heart failure hospitalization: 09/08/2024  Number of HF admissions per year: 1    Symptoms: Fatigue with exertion  Is there a family Hx of HF: No   Does patient obtain daily weight No       Heart Failure Reassessment: Not seen here at [facility]    Smoking/Tobacco Assessment  Current Smoking history quit day of his event 09/08/2024      Other Core Component Plan    Goal Status: In Progress    Other Core Component Goals:   Increase knowledge test score from 10 out of 15 to 15 out of 15 by discharge.  Maintain Resting blood pressure of <130/80 while in the program.  Provide Cardiac book” Living Well with Heart Disease” and work book before patients 5th visit.  Initiate order for smoking Cessation if needed and begin Smoking Cessation Program.  Set tobacco cessation quit date while enrolled    Other Core Component Interventions/Education:   Monitor Blood pressure pre, during, and post  workout.  Discuss knowledge quiz results within first 6 weeks of program.  Make sure patient is compliant with their medications.  Make sure patient continues to carry updated medication list and the importance of maintaining one.   Sent request for Tobacco cessation counseling. 10/23  Patient is compliant with their medications and he carries updated medication list. 10/23  Current BP at 117/70. 10/23  Provided Cardiac book” Living Well with Heart Disease” and work book and explained how they will be used verbalized understanding.  Discussed knowledge quiz results verbalized understanding.   Quiz 1 11/6 Rehab and Strokes work no questions completed. Scored 100%  HO Overview of admitting diagnosis. Discussed verbalized understanding.  Quiz 4 11/6 Medications no questions not completed.  HO Medications After Heart Attack (The Basics) discussed verbalized understanding.  HO Tips on Taking Medication discussed verbalized understanding.  HO How medicines help prevent heart attacks discussed verbalized understanding.  Current BP at 101/64. 11/15   Cessation counselor reports patient states he has not smoked since 9/8/2024 when he had his event. Maintains free of cigarette smoking but he does still smoke 2 marijuana joints per day for pain management. 11/20  Patient is compliant with their medications and he carries updated medication list. He has a couple of medication changes at this time stopped aspirin and melatonin, and was put on Spironolactone (Aldactone) 25 MG tablet, Take 0.5 tablets by mouth daily. 12/11  Current BP at 95/56. 12/11      Individual Patient Goals:    Gain Stamina  Get to my new normal    Goal Status: In Progress    Staff Comments:  60 Day Assessment. Patient has completed 19 sessions. Patient is working hard in Rehab. He is working towards his set MET Goal of 10.0 current METs at 9.4 it will be increased as tolerated. He states he has had a 60% improvement in his strength and endurance since  starting in Rehab. Patients' cardiac monitor has maintained in Normal sinus rhythm throughout exercise. He has learned that it is important to watch your diet and to exercise. He has no questions or concerns at this time.    Rehab Staff Signature: Lelia Coronado CV, RRT, RCP

## 2024-12-16 ENCOUNTER — APPOINTMENT (OUTPATIENT)
Dept: CARDIAC REHAB | Facility: HOSPITAL | Age: 54
End: 2024-12-16
Payer: COMMERCIAL

## 2024-12-18 ENCOUNTER — CLINICAL SUPPORT (OUTPATIENT)
Dept: CARDIAC REHAB | Facility: HOSPITAL | Age: 54
End: 2024-12-18
Payer: COMMERCIAL

## 2024-12-18 VITALS — SYSTOLIC BLOOD PRESSURE: 104 MMHG | DIASTOLIC BLOOD PRESSURE: 63 MMHG

## 2024-12-18 DIAGNOSIS — I21.3 ST ELEVATION MYOCARDIAL INFARCTION (STEMI), UNSPECIFIED ARTERY (MULTI): Primary | ICD-10-CM

## 2024-12-18 DIAGNOSIS — I46.9 SUDDEN CARDIAC ARREST: ICD-10-CM

## 2024-12-18 PROCEDURE — 93798 PHYS/QHP OP CAR RHAB W/ECG: CPT | Performed by: STUDENT IN AN ORGANIZED HEALTH CARE EDUCATION/TRAINING PROGRAM

## 2024-12-20 ENCOUNTER — CLINICAL SUPPORT (OUTPATIENT)
Dept: CARDIAC REHAB | Facility: HOSPITAL | Age: 54
End: 2024-12-20
Payer: COMMERCIAL

## 2024-12-20 VITALS — SYSTOLIC BLOOD PRESSURE: 143 MMHG | DIASTOLIC BLOOD PRESSURE: 61 MMHG

## 2024-12-20 DIAGNOSIS — I21.3 ST ELEVATION MYOCARDIAL INFARCTION (STEMI), UNSPECIFIED ARTERY (MULTI): Primary | ICD-10-CM

## 2024-12-20 DIAGNOSIS — I46.9 SUDDEN CARDIAC ARREST: ICD-10-CM

## 2024-12-20 PROCEDURE — 93798 PHYS/QHP OP CAR RHAB W/ECG: CPT | Performed by: STUDENT IN AN ORGANIZED HEALTH CARE EDUCATION/TRAINING PROGRAM

## 2024-12-23 ENCOUNTER — CLINICAL SUPPORT (OUTPATIENT)
Dept: CARDIAC REHAB | Facility: HOSPITAL | Age: 54
End: 2024-12-23
Payer: COMMERCIAL

## 2024-12-23 VITALS — DIASTOLIC BLOOD PRESSURE: 67 MMHG | SYSTOLIC BLOOD PRESSURE: 112 MMHG

## 2024-12-23 DIAGNOSIS — I21.3 ST ELEVATION MYOCARDIAL INFARCTION (STEMI), UNSPECIFIED ARTERY (MULTI): Primary | ICD-10-CM

## 2024-12-23 DIAGNOSIS — I46.9 SUDDEN CARDIAC ARREST: ICD-10-CM

## 2024-12-23 PROCEDURE — 93798 PHYS/QHP OP CAR RHAB W/ECG: CPT | Performed by: STUDENT IN AN ORGANIZED HEALTH CARE EDUCATION/TRAINING PROGRAM

## 2024-12-24 DIAGNOSIS — I50.41 ACUTE COMBINED SYSTOLIC (CONGESTIVE) AND DIASTOLIC (CONGESTIVE) HEART FAILURE: Primary | ICD-10-CM

## 2024-12-27 ENCOUNTER — CLINICAL SUPPORT (OUTPATIENT)
Dept: CARDIAC REHAB | Facility: HOSPITAL | Age: 54
End: 2024-12-27
Payer: COMMERCIAL

## 2024-12-27 VITALS — SYSTOLIC BLOOD PRESSURE: 102 MMHG | DIASTOLIC BLOOD PRESSURE: 53 MMHG

## 2024-12-27 DIAGNOSIS — I46.9 SUDDEN CARDIAC ARREST: ICD-10-CM

## 2024-12-27 DIAGNOSIS — I21.3 ST ELEVATION MYOCARDIAL INFARCTION (STEMI), UNSPECIFIED ARTERY (MULTI): Primary | ICD-10-CM

## 2024-12-27 PROCEDURE — 93798 PHYS/QHP OP CAR RHAB W/ECG: CPT | Performed by: STUDENT IN AN ORGANIZED HEALTH CARE EDUCATION/TRAINING PROGRAM

## 2024-12-30 ENCOUNTER — CLINICAL SUPPORT (OUTPATIENT)
Dept: CARDIAC REHAB | Facility: HOSPITAL | Age: 54
End: 2024-12-30
Payer: COMMERCIAL

## 2024-12-30 VITALS — DIASTOLIC BLOOD PRESSURE: 57 MMHG | SYSTOLIC BLOOD PRESSURE: 102 MMHG

## 2024-12-30 DIAGNOSIS — I21.3 ST ELEVATION MYOCARDIAL INFARCTION (STEMI), UNSPECIFIED ARTERY (MULTI): Primary | ICD-10-CM

## 2024-12-30 DIAGNOSIS — I46.9 SUDDEN CARDIAC ARREST: ICD-10-CM

## 2024-12-30 PROCEDURE — 93798 PHYS/QHP OP CAR RHAB W/ECG: CPT | Performed by: STUDENT IN AN ORGANIZED HEALTH CARE EDUCATION/TRAINING PROGRAM

## 2025-01-03 ENCOUNTER — CLINICAL SUPPORT (OUTPATIENT)
Dept: CARDIAC REHAB | Facility: HOSPITAL | Age: 55
End: 2025-01-03
Payer: COMMERCIAL

## 2025-01-03 VITALS — SYSTOLIC BLOOD PRESSURE: 102 MMHG | DIASTOLIC BLOOD PRESSURE: 58 MMHG

## 2025-01-03 DIAGNOSIS — I21.3 ST ELEVATION MYOCARDIAL INFARCTION (STEMI), UNSPECIFIED ARTERY (MULTI): Primary | ICD-10-CM

## 2025-01-03 DIAGNOSIS — I46.9 SUDDEN CARDIAC ARREST: ICD-10-CM

## 2025-01-03 PROCEDURE — 93798 PHYS/QHP OP CAR RHAB W/ECG: CPT | Performed by: STUDENT IN AN ORGANIZED HEALTH CARE EDUCATION/TRAINING PROGRAM

## 2025-01-06 ENCOUNTER — APPOINTMENT (OUTPATIENT)
Dept: CARDIAC REHAB | Facility: HOSPITAL | Age: 55
End: 2025-01-06
Payer: COMMERCIAL

## 2025-01-06 NOTE — PROGRESS NOTES
Cardiac Rehabilitation 90 Day Assessment    Name: Christopher D Hosler  Medical Record Number: [id number]  YOB: 1970  Age: 54 y.o.    Today's Date: 1/3/2024  Primary Care Physician: Dipika Lopez MD  Referring Physician: Cristi Joseph MD  Program Location: [address]   Exercise Start Date: 10/28/2024    General  Primary Diagnosis:   1. ST elevation myocardial infarction (STEMI), unspecified artery (Multi)  Referral to Cardiac Rehab      2. Cardiac arrest  Referral to Cardiac Rehab         Onset/Date of Diagnosis: 9/8/2024    Session #: 25    AACVPR Risk Stratification: Moderate    Falls Risk: Low  Psychosocial Assessment     Pre PHQ-9: 3    Post PHQ-9:  Sent PH-Q 9 to MD if score > 20: NO; score <20      Pt reported/currently experiencing stress: No  Patient uses stress management skills: Yes   History of: no history of anxiety or depression  Currently seeing a mental health provider: No  Social Support: Yes, Whom:Spouse  Quality of Life Survey: SF-36   SF-36 Pre Post   Physical Component Score 36.17 TBD   Mental Component Score 55.92 TBD     Learning Assessment:  Learning assessment/barriers: None  Preferred learning method: ALL  Barriers: None  Comments: NONE    Stages of Change :Action    Psychosocial Plan    Goal Status: In Progress    Psychosocial Goals:   1. Maintain or decrease PHQ-9 score of 3 by discharge.  2. Improve SF36 scores by discharge. Will retake prior to discharge.  3. Question patient on new or current psychological issues at least every 30 days.  4. Educate patient on Stress Management skills and apply them to reported stressors while in the program.    Psychosocial Interventions/Education:   Provide one on one emotional support as needed.   Current PQ9 score at 3. 10/23  Patient reports current Emotional and Stress level at 4-5. 10-23  Quiz 6 11/6 Your Emotional Health no questions not completed.   HO Stress Management Overview 8 Tips / Steps discussed  "verbalized understanding.  HO Stress Less for a Healthier Heart.  Questioned patient on new or current psychological issues none to repost at this time. 11/15  Current stress level at 1. 12/11  Questioned patient on new or current psychological issues none to report at this time. 12/11  Current stress level at zero. 1/3  Questioned patient on new or current psychological issues none to report at this time. 1/3    Nutrition Assessment:    Hyperlipidemia: Only see High Triglycerides on patients chart.    No results found in chart 10/23/2024  Lipids:   No results found for: \"CHOL\", \"HDL\", \"LDLF\", \"TRIG\"    Current Dietary Guidelines: PYP scores 48/96 Unlikely to meet current recommendations for good health and has room for improvement.  Barriers to dietary change: no    Diet Habit Survey: Picture Your Plate  Pre: 48/96  Post: To be done at discharge.    Diabetes Assessment    No results found for: \"HGBA1C\"    History of Diabetes: No    Weight Management    Height: 70\"  Weight LBS: PRE: 147   POST:  Change:   BMI (Calculated): 21.1  Waist Circumference: PRE: 33\"  POST:  LOST:    Nutrition Plan    Goal Status: In Progress    Nutrition Goals:   Maintain or improve your \"Picture Your Plate Score\" from 48 to 60 or greater by discharge.  Lose 1 inch from waist by discharge ( 33 inches on admit).  Provide education on nutritional aspects related to cardiac health and discuss dietary educational topics while in the program.  Educate patient on their admitting Diagnosis of STEMI / Cardiac Arrest , and their medical HX and how they are relevant to their health.    Patient has a HX of STEMI, CAD, Acute systolic heart failure.    Nutrition Interventions/Education:   Discuss \"Picture Your Plate Score\" within the first 6 weeks of program  Provided HO overview of admitting diagnosis. Discussed and verbalized understanding.  Quiz 2 11/6 Nutrition no questions not completed.     Discussed \"Picture Your Plate Scores\" and ways to improve " scores. Verbalized understanding.   HO Mediterranean Diet Overview, What and how much to Eat, Meals and Snacking, Resources to more information, and Shopping Ideas. Discussed and verbalized understanding.   HO Tasting The Mediterranean Diet 8 Simple Steps discussed verbalized understanding.  HO Heart Healthy Eating on a Budget. Discussed and verbalized understanding.  HO Building a Heart Healthy Plate. Discussed and verbalized understanding.   Quiz 5 11/6 Managing specific risk factors High cholesterol, Blood Pressure and Diabetes no questions not completed.  HO Overview on Cholesterol discussed and verbalized understanding.  HO Blood Pressure, Herbs and Spices instead of Salt discussed verbalized understanding.  HO Consequences of High Blood Pressure discussed verbalized understanding.  HO Understanding Diabetes, Take Diabetes to Heart discussed verbalized understanding.   HO Diabetes Know your numbers discussed and verbalized understanding.  HO ADA Food for Thought discussed verbalized understanding.  HO High Cholesterol Overview discussed verbalized understanding.  HO How to control Cholesterol discussed verbalized understanding.  HO How do my cholesterol levels affect my risk of heart attack and stroke discussed verbalized understanding.  HO Cholesterol know your numbers discussed verbalized understanding.     Exercise Assessment    Home Exercise: Yes / Walking / Elliptical  Mode: Aerobic  Frequency: 2xWeek  Duration: 30+ minutes    Exercise Prescription     Exercise Prescription based on: 6 Minute Walk Test     DASI: TL SCORE: 24.2     MET Score:  5.7    6MWT: Yes  Pre Test O2 Sat/HR: 97/52  6 Minute O2 Sat/HR: 98/59  Distance Walked(ft): 1472  Greer Dyspnea: 0  Greer PRE: 0  Post Test O2 Sat/HR: 99/51  Adverse Reactions: none      Frequency:  3 days/week   Mode: Aerobic   Duration: >30 total aerobic minutes   Intensity: RPE <15  Target HR:  Rest+30  MET Level: PRE: 3.13   POST:  Patient wears supplemental O2:  No  Current Max exercise Mets: 10.3     Modality METs Workload LVL Duration (minutes)   1 Pre-Exercise       2 Rest     3 :00   3 NuStep 5.0 [address]  5 12 :00   4 Recumbent Bike 10.3 [address]  5 12 :00   5 Treadmill 4.3 3.1 MPH  2% 12 :00   6 Weights 4.5 5 LBS 10 5:00   7 Final Cool Down    3:00   8 Post-Exercise         Maintain Heart Rate at 80-90% of Maximum for patients age 133-150   Resistance Training: Yes to started on patient 9th visit.  Home Exercise Prescription given: To be given prior to discharge from program.    Exercise Plan    Goal Status: In Progress    Exercise Goals:   Improve post 6MW by discharge.  Increase Mets to 5.0 by the end of the program. Achieved 11/8  Start exercise program at home second week into the program.  Gain independence and confidence with exercise while in the program.   Have a plan for continuing exercise after completion of program.  New Mets goal 10.0 by end of program. Achieved 12/27  New Mets goal 13.0 by the end of program.    Exercise Interventions/Education:   What exactly are METs handout provided and discussed Verbalized understanding. 10/23  OBDULIO exertion handout provided and discussed patient verbalized understanding. 10/23  Increase METs by 1.0 every weeks or as tolerated.  Patient is not doing any home exercises. We had a discussion on the importance of exercising at home.   Quiz 3 11/6 Exercising More no questions not completed.  HO Aerobic Exercising Overview discussed verbalized understanding.  HO Real - Life Benefits of Exercise and Physical Activity discussed verbalized understanding.  HO Drinking Enough Fluids discussed verbalized understanding.  HO 4 Types of Exercises to improve your discussed verbalized understanding  HO [facility] Warm Ups/Cool downs and Strength Training Exercises that are displayed in class.  Achieved first Mets goal of 5. 11/8  Current Mets at 6.8. 11/15/2024   Patient currently exercising at home walking 3 x week for 20+ minutes  per day. 11/15  Current Mets at 9.4. 12/11  Patient currently exercising at home walking 2 x week for 30+ minutes per day. 12/11  Achieved second Mets goal of 10.0 Mets. 12/27  Current Mets at 10.3. 1/3  Patient currently exercising at home walking / elliptical 2 x week for 30+ minutes per day. 1/3    Other Core Components/Risk Factor Assessment:    Medication adherence  Current Medications:        Start Date End Date    amiodarone 400 MG tablet  Take 1 tablet by mouth daily. 30 tablet  5   apixaban 5 MG tablet   Indications: L Ventricular Thrombus Post MI Take 1 tablet by mouth every 12 hours for 90 days, THEN 1 tablet every 12 hours. 60 tablet  3   aspirin 81 MG Chew Tab chewable tablet  Chew 1 tablet daily. 30 tablet      atorvastatin 80 MG tablet  Take 1 tablet by mouth at bedtime. 30 tablet  3   Clopidogrel 75 MG tablet  Take 1 tablet by mouth daily. 30 tablet  3   Folic acid 1 MG tablet  Take 1 tablet by mouth daily. 30 tablet  3   Melatonin 3 MG tablet  Take 1 tablet by mouth at bedtime. 30 tablet  3   Metoprolol succinate 25 MG tablet XL  Take 1 tablet by mouth daily. 30 tablet  3   Ondansetron 4 MG tablet  Take 1 tablet by mouth every 6 hours as needed for Nausea / Vomiting. 30 tablet      Pantoprazole 40 MG Tab DR tablet    Indications: Inpt Stress Ulcer Prophylaxis Take 1 tablet by mouth daily. 30 tablet  2   sacubitril-valsartan 24-26 MG tablet  Take 1 tablet by mouth every 12 hours. 60 tablet  2   Senna 8.6 MG tablet  Take 1 tablet by mouth daily as needed. 30 tablet  2   triamcinolone 0.1 % Ointment ointment  Apply to rash       Ipratropium-albuterol 0.5-2.5 (3) MG/3ML nebulizer solution  Take 3 mL by nebulization every 6 hours as needed for Breathing Treatment. 100 mL        Prescription Sig Dispensed Refills   Senna 8.6 MG tablet  Take 1 tablet by mouth daily as needed. 30 tablet  2   Pantoprazole 40 MG Tab DR tablet    Indications: Inpt Stress Ulcer Prophylaxis Take 1 tablet by mouth daily. 30  tablet  2   Ondansetron 4 MG tablet  Take 1 tablet by mouth every 6 hours as needed for Nausea / Vomiting. 30 tablet      Melatonin 3 MG tablet  Take 1 tablet by mouth at bedtime. 30 tablet  3   sacubitril-valsartan 24-26 MG tablet  Take 1 tablet by mouth every 12 hours. 60 tablet  2   Metoprolol succinate 25 MG tablet XL  Take 1 tablet by mouth daily. 30 tablet  3   Folic acid 1 MG tablet  Take 1 tablet by mouth daily. 30 tablet  3   atorvastatin 80 MG tablet  Take 1 tablet by mouth at bedtime. 30 tablet  3   aspirin 81 MG Chew Tab chewable tablet  Chew 1 tablet daily. 30 tablet      Clopidogrel 75 MG tablet  Take 1 tablet by mouth daily. 30 tablet  3   apixaban 5 MG tablet   Indications: L Ventricular Thrombus Post MI Take 1 tablet by mouth every 12 hours for 90 days, THEN 1 tablet every 12 hours. 60 tablet  3   amiodarone 400 MG tablet  Take 1 tablet by mouth daily. 30 tablet  5   Ipratropium-albuterol 0.5-2.5 (3) MG/3ML nebulizer solution  Take 3 mL by nebulization every 6 hours as needed for Breathing Treatment. 100 mL                    Medication compliance: Yes   Uses pill box/organizer: Yes    Carries medication list: Yes     Blood Pressure Management  History of Hypertension: No   Medication Changes: No   Resting BP:  117/70    Heart Failure Management  Hx of Heart Failure: Yes;   Type (selection):   Most recent EF: 30%     Onset of heart failure diagnosis: 09/08/2024  Last heart failure hospitalization: 09/08/2024  Number of HF admissions per year: 1    Symptoms: Fatigue with exertion  Is there a family Hx of HF: No   Does patient obtain daily weight No       Heart Failure Reassessment: Not seen here at [facility]    Smoking/Tobacco Assessment  Current Smoking history quit day of his event 09/08/2024      Other Core Component Plan    Goal Status: In Progress    Other Core Component Goals:   Increase knowledge test score from 10 out of 15 to 15 out of 15 by discharge.  Maintain Resting blood pressure of  <130/80 while in the program.  Provide Cardiac book” Living Well with Heart Disease” and work book before patients 5th visit.  Initiate order for smoking Cessation if needed and begin Smoking Cessation Program.  Set tobacco cessation quit date while enrolled    Other Core Component Interventions/Education:   Monitor Blood pressure pre, during, and post workout.  Discuss knowledge quiz results within first 6 weeks of program.  Make sure patient is compliant with their medications.  Make sure patient continues to carry updated medication list and the importance of maintaining one.   Sent request for Tobacco cessation counseling. 10/23  Patient is compliant with their medications and he carries updated medication list. 10/23  Current BP at 117/70. 10/23  Provided Cardiac book” Living Well with Heart Disease” and work book and explained how they will be used verbalized understanding.  Discussed knowledge quiz results verbalized understanding.   Quiz 1 11/6 Rehab and Strokes work no questions completed. Scored 100%  HO Overview of admitting diagnosis. Discussed verbalized understanding.  Quiz 4 11/6 Medications no questions not completed.  HO Medications After Heart Attack (The Basics) discussed verbalized understanding.  HO Tips on Taking Medication discussed verbalized understanding.  HO How medicines help prevent heart attacks discussed verbalized understanding.  Current BP at 101/64. 11/15   Cessation counselor reports patient states he has not smoked since 9/8/2024 when he had his event. Maintains free of cigarette smoking but he does still smoke 2 marijuana joints per day for pain management. 11/20  Patient is compliant with their medications and he carries updated medication list. He has a couple of medication changes at this time stopped aspirin and melatonin, and was put on Spironolactone (Aldactone) 25 MG tablet, Take 0.5 tablets by mouth daily. 12/11  Current BP at 95/56. 12/11  Patient had Life Vest removed  EF is 55%. 12/12  Patient is compliant with their medications and he carries updated medication list. He has no medication changes at this time. 1/3  Current BP at 102/58. 1/3      Individual Patient Goals:    Gain Stamina  Get to my new normal    Goal Status: In Progress    Staff Comments:  90 Day Assessment. Patient has completed 25 sessions. Patient is working hard in Rehab.He has achieved his second Mets goal of 10.0.  He is working towards his set MET Goal of 13.0 current METs at 10.3 it will be increased as tolerated. He states he has had a 75% improvement in his strength and endurance since starting in Rehab. Patients' cardiac monitor has maintained in Normal sinus rhythm throughout exercise. He has learned that it is important to watch your diet and to exercise. He has no questions or concerns at this time.    Rehab Staff Signature: Lelia Coronado, CV, RRT, RCP

## 2025-01-08 ENCOUNTER — CLINICAL SUPPORT (OUTPATIENT)
Dept: CARDIAC REHAB | Facility: HOSPITAL | Age: 55
End: 2025-01-08
Payer: COMMERCIAL

## 2025-01-08 DIAGNOSIS — I21.3 ST ELEVATION MYOCARDIAL INFARCTION (STEMI), UNSPECIFIED ARTERY (MULTI): Primary | ICD-10-CM

## 2025-01-08 DIAGNOSIS — I46.9 SUDDEN CARDIAC ARREST: ICD-10-CM

## 2025-01-08 PROCEDURE — 93798 PHYS/QHP OP CAR RHAB W/ECG: CPT | Performed by: STUDENT IN AN ORGANIZED HEALTH CARE EDUCATION/TRAINING PROGRAM

## 2025-01-10 ENCOUNTER — CLINICAL SUPPORT (OUTPATIENT)
Dept: CARDIAC REHAB | Facility: HOSPITAL | Age: 55
End: 2025-01-10
Payer: COMMERCIAL

## 2025-01-10 VITALS — SYSTOLIC BLOOD PRESSURE: 101 MMHG | DIASTOLIC BLOOD PRESSURE: 62 MMHG

## 2025-01-10 DIAGNOSIS — I46.9 SUDDEN CARDIAC ARREST: ICD-10-CM

## 2025-01-10 DIAGNOSIS — I21.3 ST ELEVATION MYOCARDIAL INFARCTION (STEMI), UNSPECIFIED ARTERY (MULTI): Primary | ICD-10-CM

## 2025-01-10 PROCEDURE — 93798 PHYS/QHP OP CAR RHAB W/ECG: CPT | Performed by: STUDENT IN AN ORGANIZED HEALTH CARE EDUCATION/TRAINING PROGRAM

## 2025-01-13 ENCOUNTER — CLINICAL SUPPORT (OUTPATIENT)
Dept: CARDIAC REHAB | Facility: HOSPITAL | Age: 55
End: 2025-01-13
Payer: COMMERCIAL

## 2025-01-13 VITALS — SYSTOLIC BLOOD PRESSURE: 104 MMHG | DIASTOLIC BLOOD PRESSURE: 51 MMHG

## 2025-01-13 DIAGNOSIS — I46.9 SUDDEN CARDIAC ARREST: ICD-10-CM

## 2025-01-13 DIAGNOSIS — I21.3 ST ELEVATION MYOCARDIAL INFARCTION (STEMI), UNSPECIFIED ARTERY (MULTI): Primary | ICD-10-CM

## 2025-01-13 PROCEDURE — 93798 PHYS/QHP OP CAR RHAB W/ECG: CPT | Performed by: STUDENT IN AN ORGANIZED HEALTH CARE EDUCATION/TRAINING PROGRAM

## 2025-01-15 ENCOUNTER — CLINICAL SUPPORT (OUTPATIENT)
Dept: CARDIAC REHAB | Facility: HOSPITAL | Age: 55
End: 2025-01-15
Payer: COMMERCIAL

## 2025-01-15 DIAGNOSIS — I21.3 ST ELEVATION MYOCARDIAL INFARCTION (STEMI), UNSPECIFIED ARTERY (MULTI): Primary | ICD-10-CM

## 2025-01-15 DIAGNOSIS — I46.9 SUDDEN CARDIAC ARREST: ICD-10-CM

## 2025-01-15 PROCEDURE — 93798 PHYS/QHP OP CAR RHAB W/ECG: CPT | Performed by: STUDENT IN AN ORGANIZED HEALTH CARE EDUCATION/TRAINING PROGRAM

## 2025-01-17 ENCOUNTER — CLINICAL SUPPORT (OUTPATIENT)
Dept: CARDIAC REHAB | Facility: HOSPITAL | Age: 55
End: 2025-01-17
Payer: COMMERCIAL

## 2025-01-17 DIAGNOSIS — I21.3 ST ELEVATION MYOCARDIAL INFARCTION (STEMI), UNSPECIFIED ARTERY (MULTI): Primary | ICD-10-CM

## 2025-01-17 DIAGNOSIS — I46.9 SUDDEN CARDIAC ARREST: ICD-10-CM

## 2025-01-17 PROCEDURE — 93798 PHYS/QHP OP CAR RHAB W/ECG: CPT | Performed by: STUDENT IN AN ORGANIZED HEALTH CARE EDUCATION/TRAINING PROGRAM

## 2025-01-20 ENCOUNTER — CLINICAL SUPPORT (OUTPATIENT)
Dept: CARDIAC REHAB | Facility: HOSPITAL | Age: 55
End: 2025-01-20
Payer: COMMERCIAL

## 2025-01-20 DIAGNOSIS — I21.3 ST ELEVATION MYOCARDIAL INFARCTION (STEMI), UNSPECIFIED ARTERY (MULTI): Primary | ICD-10-CM

## 2025-01-20 DIAGNOSIS — I46.9 SUDDEN CARDIAC ARREST: ICD-10-CM

## 2025-01-20 PROCEDURE — 93798 PHYS/QHP OP CAR RHAB W/ECG: CPT | Performed by: STUDENT IN AN ORGANIZED HEALTH CARE EDUCATION/TRAINING PROGRAM

## 2025-01-22 ENCOUNTER — APPOINTMENT (OUTPATIENT)
Dept: CARDIAC REHAB | Facility: HOSPITAL | Age: 55
End: 2025-01-22
Payer: COMMERCIAL

## 2025-01-22 DIAGNOSIS — I46.9 CARDIAC ARREST: ICD-10-CM

## 2025-01-22 DIAGNOSIS — I21.3 ST ELEVATION MYOCARDIAL INFARCTION (STEMI) (MULTI): Primary | ICD-10-CM

## 2025-01-22 PROCEDURE — 93798 PHYS/QHP OP CAR RHAB W/ECG: CPT | Performed by: STUDENT IN AN ORGANIZED HEALTH CARE EDUCATION/TRAINING PROGRAM

## 2025-01-24 ENCOUNTER — APPOINTMENT (OUTPATIENT)
Dept: CARDIAC REHAB | Facility: HOSPITAL | Age: 55
End: 2025-01-24
Payer: COMMERCIAL

## 2025-01-24 DIAGNOSIS — I46.9 SUDDEN CARDIAC ARREST: ICD-10-CM

## 2025-01-24 DIAGNOSIS — I21.3 ST ELEVATION MYOCARDIAL INFARCTION (STEMI), UNSPECIFIED ARTERY (MULTI): Primary | ICD-10-CM

## 2025-01-24 PROCEDURE — 93798 PHYS/QHP OP CAR RHAB W/ECG: CPT | Performed by: STUDENT IN AN ORGANIZED HEALTH CARE EDUCATION/TRAINING PROGRAM

## 2025-01-27 ENCOUNTER — APPOINTMENT (OUTPATIENT)
Dept: CARDIAC REHAB | Facility: HOSPITAL | Age: 55
End: 2025-01-27
Payer: COMMERCIAL

## 2025-01-27 DIAGNOSIS — I21.3 ST ELEVATION MYOCARDIAL INFARCTION (STEMI), UNSPECIFIED ARTERY (MULTI): Primary | ICD-10-CM

## 2025-01-27 DIAGNOSIS — I46.9 SUDDEN CARDIAC ARREST: ICD-10-CM

## 2025-01-27 PROCEDURE — 93798 PHYS/QHP OP CAR RHAB W/ECG: CPT | Performed by: STUDENT IN AN ORGANIZED HEALTH CARE EDUCATION/TRAINING PROGRAM

## 2025-01-29 ENCOUNTER — APPOINTMENT (OUTPATIENT)
Dept: CARDIAC REHAB | Facility: HOSPITAL | Age: 55
End: 2025-01-29
Payer: COMMERCIAL

## 2025-01-29 VITALS — SYSTOLIC BLOOD PRESSURE: 92 MMHG | DIASTOLIC BLOOD PRESSURE: 54 MMHG

## 2025-01-29 DIAGNOSIS — I21.3 ST ELEVATION MYOCARDIAL INFARCTION (STEMI), UNSPECIFIED ARTERY (MULTI): Primary | ICD-10-CM

## 2025-01-29 DIAGNOSIS — I46.9 SUDDEN CARDIAC ARREST: ICD-10-CM

## 2025-01-29 PROCEDURE — 93798 PHYS/QHP OP CAR RHAB W/ECG: CPT | Performed by: STUDENT IN AN ORGANIZED HEALTH CARE EDUCATION/TRAINING PROGRAM

## 2025-01-29 NOTE — PROGRESS NOTES
Cardiac Rehabilitation Discharge Assessment    Name: Christopher D Hosler  Medical Record Number: [id number]  YOB: 1970  Age: 54 y.o.    Today's Date: 1/29/2024  Primary Care Physician: Dipika Lopez MD  Referring Physician: Cristi Joseph MD  Program Location: [address]   Exercise Start Date: 10/28/2024    General  Primary Diagnosis:   1. ST elevation myocardial infarction (STEMI), unspecified artery (Multi)  Referral to Cardiac Rehab      2. Cardiac arrest  Referral to Cardiac Rehab         Onset/Date of Diagnosis: 9/8/2024    Session #: 35    AACVPR Risk Stratification: Moderate    Falls Risk: Low  Psychosocial Assessment     Pre PHQ-9: 3    Post PHQ-9: 1  Sent PH-Q 9 to MD if score > 20: NO; score <20      Pt reported/currently experiencing stress: No  Patient uses stress management skills: Yes   History of: no history of anxiety or depression  Currently seeing a mental health provider: No  Social Support: Yes, Whom: Spouse  Quality of Life Survey: SF-36   SF-36 Pre Post   Physical Component Score 36.17 52.31   Mental Component Score 55.92 59.77     Learning Assessment:  Learning assessment/barriers: None  Preferred learning method: ALL  Barriers: None  Comments: NONE    Stages of Change : Maintenance  Psychosocial Plan    Goal Status: MET    Psychosocial Goals:   1. Maintain or decrease PHQ-9 score of 3 by discharge. Achieved 1/29  2. Improve SF36 scores by discharge. Will retake prior to discharge. Achieved 1/29  3. Question patient on new or current psychological issues at least every 30 days. Achieved during Program. 1/29  4. Educate patient on Stress Management skills and apply them to reported stressors while in the program. Achieved through patient education in program. 1/29    Psychosocial Interventions/Education:   Provide one on one emotional support as needed.   Current PQ9 score at 3. 10/23  Patient reports current Emotional and Stress level at 4-5. 10-23  Quiz 6  "11/6 Your Emotional Health no questions completed. 12/16   HO Stress Management Overview 8 Tips / Steps discussed verbalized understanding.11/6  HO Stress Less for a Healthier Heart.11/6  Questioned patient on new or current psychological issues none to repost at this time. 11/15  Current stress level at 1. 12/11  Questioned patient on new or current psychological issues none to report at this time. 12/11  Current stress level at zero. 1/3  Questioned patient on new or current psychological issues none to report at this time. 1/3  Decreased PHQ-9 score of 3 to 1. 1/29  Improved SF36 scores of MCS 36.17 to 52.31 and PSC of 55.92 to 59.77. 1/29  Questioned patient on new or current psychological issues none to report at this time. 1/29  Current stress level at Zero. 1/29    Nutrition Assessment:    Hyperlipidemia: Only see High Triglycerides on patients chart.    No results found in chart 10/23/2024  Lipids:   No results found for: \"CHOL\", \"HDL\", \"LDLF\", \"TRIG\"    Current Dietary Guidelines: PYP scores 48/96 Unlikely to meet current recommendations for good health and has room for improvement.  Barriers to dietary change: no    Diet Habit Survey: Picture Your Plate  Pre: 48/96  Post: 61/96    Diabetes Assessment    No results found for: \"HGBA1C\"    History of Diabetes: No    Weight Management    Height: 70\"  Weight LBS: PRE: 147   POST: 147  Change: 0  BMI (Calculated): 21.1  Waist Circumference: PRE: 33\"  POST:32”  LOST:  -1”    Nutrition Plan    Goal Status: MET    Nutrition Goals:   Maintain or improve your \"Picture Your Plate Score\" from 48 to 60 or greater by discharge. Achieved 1/29  Lose 1 inch from waist by discharge (33 inches on admit). Achieved 1/29  Provide education on nutritional aspects related to cardiac health and discuss dietary educational topics while in the program. Achieved throughout program education. 1/29  Educate patient on their admitting Diagnosis of STEMI / Cardiac Arrest, and their " "medical HX and how they are relevant to their health. Achieved 11/6   Patient has a HX of STEMI, CAD, and Acute systolic heart failure.    Nutrition Interventions/Education:   Discuss \"Picture Your Plate Score\" within the first 6 weeks of program  Provided HO overview of admitting diagnosis. Discussed and verbalized understanding.11/6  Quiz 2 11/6 Nutrition no questions completed. 12/16   Discussed \"Picture Your Plate Scores\" and ways to improve scores. Verbalized understanding. 11/6  HO Mediterranean Diet Overview, What and how much to Eat, Meals and Snacking, Resources to more information, and Shopping Ideas. Discussed and verbalized understanding. 11/6  HO Tasting The Mediterranean Diet 8 Simple Steps discussed verbalized understanding. 11/6  HO Heart Healthy Eating on a Budget. Discussed and verbalized understanding. 11/6  HO Building a Heart Healthy Plate. Discussed and verbalized understanding. 11/6  Quiz 5 11/6 Managing specific risk factors High cholesterol, Blood Pressure and Diabetes no questions completed. 12/16  HO Overview on Cholesterol discussed and verbalized understanding. 11/6  HO Blood Pressure, Herbs and Spices instead of Salt discussed verbalized understanding. 11/6  HO Consequences of High Blood Pressure discussed verbalized understanding.11/6  HO Understanding Diabetes, Take Diabetes to Heart discussed verbalized understanding. 11/6  HO Diabetes Know your numbers discussed and verbalized understanding.11/6  HO ADA Food for Thought discussed verbalized understanding.11/6  HO High Cholesterol Overview discussed verbalized understanding.11/6  HO How to control Cholesterol discussed verbalized understanding.11/6  HO How do my cholesterol levels affect my risk of heart attack and stroke discussed verbalized understanding.11/6  HO Cholesterol know your numbers discussed verbalized understanding.11/6  Improved your \"Picture Your Plate Score\" from 48 to 61. 1/29  Lost 1 inch from his waist. 1/29   "   Exercise Assessment    Home Exercise: Yes / Walking / Elliptical  Mode: Aerobic  Frequency: 2xWeek  Duration: 30+ minutes    Exercise Prescription     Exercise Prescription based on: 6 Minute Walk Test     DASI: TL SCORE: 24.2     MET Score:  5.7    6MWT: Yes  Pre Test O2 Sat/HR: 97/52  6 Minute O2 Sat/HR: 98/59  Distance Walked(ft): PRE: 1472  POST:  1800  [place] Dyspnea: 0  Greer PRE: 0  Post Test O2 Sat/HR: 99/51  Adverse Reactions: none      Frequency:  3 days/week   Mode: Aerobic   Duration: >30 total aerobic minutes   Intensity: RPE <15  Target HR:  Rest+30  MET Level: PRE: 3.13   POST: 3.61  Patient wears supplemental O2: No  Current Max exercise Mets: 11.2     Modality METs Workload LVL Duration (minutes)   1 Pre-Exercise       2 Rest     3 :00   3 NuStep 5.4 108 RICHARDSON  5 12 :00   4 Recumbent Bike 11.2 102 RICH[address]ON  5 12 :00   5 Treadmill 5.4 3.3 MPH  4% 12 :00   6 Weights 4.5 6 LBS 10 5:00   7 Final Cool Down    3:00   8 Post-Exercise         Maintain Heart Rate at 80-90% of Maximum for patients age 133-150   Resistance Training: Yes to started on patient 9th visit.  Home Exercise Prescription given:  Given at discharge. 1/29    Exercise Plan    Goal Status: Partially Met    Exercise Goals:   Improve post 6MW by discharge. Achieved 1/29  Increase Mets to 5.0 by the end of the program. Achieved 11/8  Start exercise program at home second week into the program. Achieved 11/13  Gain independence and confidence with exercise while in the program. Achieved 1/29  Have a plan for continuing exercise after completion of program. Achieved 1/29  New Mets goal 10.0 by end of program. Achieved 12/27  New Mets goal 13.0 by the end of program. Not Achieved    Exercise Interventions/Education:   What exactly are METs handout provided and discussed Verbalized understanding. 10/23  GREER exertion handout provided and discussed patient verbalized understanding. 10/23  Increase METs by 1.0 every weeks or as  tolerated.  Patient is not doing any home exercises. We had a discussion on the importance of exercising at home.   Quiz 3 11/6 Exercising More no questions completed. 12/16  HO Aerobic Exercising Overview discussed verbalized understanding.11/6  HO Real - Life Benefits of Exercise and Physical Activity discussed verbalized understanding.11/6  HO Drinking Enough Fluids discussed verbalized understanding.11/6  HO 4 Types of Exercises to improve your discussed verbalized understanding. 11/6  HO [facility] Warm Ups/Cool downs and Strength Training Exercises that are displayed in class. 11/6  Achieved first Mets goal of 5. 11/8  Current Mets at 6.8. 11/15/2024   Patient currently exercising at home walking 3 x week for 20+ minutes per day. 11/15  Current Mets at 9.4. 12/11  Patient currently exercising at home walking 2 x week for 30+ minutes per day. 12/11  Achieved second Mets goal of 10.0 Mets. 12/27  Current Mets at 10.3. 1/3  Patient currently exercising at home walking / elliptical 2 x week for 30+ minutes per day. 1/3  Gained independence and confidence with exercise while in the program by achieving goals. 1/29  Patients plan for continuing exercise after Rehab will be to exercise at home he has an elliptical and he will be walking, 5days per week for 30+ minutes per day. 1/29  Improved post 6MWT by 328 Feet. 1/29  Current Mets at 11.2. 1/29    Other Core Components/Risk Factor Assessment:    Medication adherence  Current Medications:        Start Date End Date    amiodarone 400 MG tablet  Take 1 tablet by mouth daily. 30 tablet  5   apixaban 5 MG tablet   Indications: L Ventricular Thrombus Post MI Take 1 tablet by mouth every 12 hours for 90 days, THEN 1 tablet every 12 hours. 60 tablet  3   aspirin 81 MG Chew Tab chewable tablet  Chew 1 tablet daily. 30 tablet      atorvastatin 80 MG tablet  Take 1 tablet by mouth at bedtime. 30 tablet  3   Clopidogrel 75 MG tablet  Take 1 tablet by mouth daily. 30 tablet  3    Folic acid 1 MG tablet  Take 1 tablet by mouth daily. 30 tablet  3   Melatonin 3 MG tablet  Take 1 tablet by mouth at bedtime. 30 tablet  3   Metoprolol succinate 25 MG tablet XL  Take 1 tablet by mouth daily. 30 tablet  3   Ondansetron 4 MG tablet  Take 1 tablet by mouth every 6 hours as needed for Nausea / Vomiting. 30 tablet      Pantoprazole 40 MG Tab DR tablet    Indications: Inpt Stress Ulcer Prophylaxis Take 1 tablet by mouth daily. 30 tablet  2   sacubitril-valsartan 24-26 MG tablet  Take 1 tablet by mouth every 12 hours. 60 tablet  2   Senna 8.6 MG tablet  Take 1 tablet by mouth daily as needed. 30 tablet  2   triamcinolone 0.1 % Ointment ointment  Apply to rash       Ipratropium-albuterol 0.5-2.5 (3) MG/3ML nebulizer solution  Take 3 mL by nebulization every 6 hours as needed for Breathing Treatment. 100 mL        Prescription Sig Dispensed Refills   Senna 8.6 MG tablet  Take 1 tablet by mouth daily as needed. 30 tablet  2   Pantoprazole 40 MG Tab DR tablet    Indications: Inpt Stress Ulcer Prophylaxis Take 1 tablet by mouth daily. 30 tablet  2   Ondansetron 4 MG tablet  Take 1 tablet by mouth every 6 hours as needed for Nausea / Vomiting. 30 tablet      Melatonin 3 MG tablet  Take 1 tablet by mouth at bedtime. 30 tablet  3   sacubitril-valsartan 24-26 MG tablet  Take 1 tablet by mouth every 12 hours. 60 tablet  2   Metoprolol succinate 25 MG tablet XL  Take 1 tablet by mouth daily. 30 tablet  3   Folic acid 1 MG tablet  Take 1 tablet by mouth daily. 30 tablet  3   atorvastatin 80 MG tablet  Take 1 tablet by mouth at bedtime. 30 tablet  3   aspirin 81 MG Chew Tab chewable tablet  Chew 1 tablet daily. 30 tablet      Clopidogrel 75 MG tablet  Take 1 tablet by mouth daily. 30 tablet  3   apixaban 5 MG tablet   Indications: L Ventricular Thrombus Post MI Take 1 tablet by mouth every 12 hours for 90 days, THEN 1 tablet every 12 hours. 60 tablet  3   amiodarone 400 MG tablet  Take 1 tablet by mouth daily.  30 tablet  5   Ipratropium-albuterol 0.5-2.5 (3) MG/3ML nebulizer solution  Take 3 mL by nebulization every 6 hours as needed for Breathing Treatment. 100 mL                    Medication compliance: Yes   Uses pill box/organizer: Yes    Carries medication list: Yes     Blood Pressure Management  History of Hypertension: No   Medication Changes: No   Resting BP:  117/70    Heart Failure Management  Hx of Heart Failure: Yes;   Type (selection):   Most recent EF: 30%     Onset of heart failure diagnosis: 09/08/2024  Last heart failure hospitalization: 09/08/2024  Number of HF admissions per year: 1    Symptoms: Fatigue with exertion  Is there a family Hx of HF: No   Does patient obtain daily weight No       Heart Failure Reassessment: Not seen here at [facility] at [facility]    Smoking/Tobacco Assessment  Current Smoking history quit day of his event 09/08/2024      Other Core Component Plan    Goal Status: Met    Other Core Component Goals:   Increase knowledge test score from 10 out of 15 to 15 out of 15 by discharge. Achieved 1/29  Maintain Resting blood pressure of <130/80 while in the program. Achieved throughout program. 1/29  Provide Cardiac book” Living Well with Heart Disease” and work book before patients 5th visit. 11/6  Initiate order for smoking Cessation if needed and begin Smoking Cessation Program. Achieved 10/23  Set tobacco cessation quit date while enrolled. Achieved 9/8/2024 per session on 11/20    Other Core Component Interventions/Education:   Monitor Blood pressure pre, during, and post workout.  Discuss knowledge quiz results within first 6 weeks of program. 11/6  Make sure patient is compliant with their medications.  Make sure patient continues to carry updated medication list and the importance of maintaining one.   Initiated request for Tobacco cessation counseling. 10/23  Patient is compliant with their medications and he carries updated medication list. 10/23  Current BP at 117/70.  10/23  Provided Cardiac book” Living Well with Heart Disease” and work book and explained how they will be used verbalized understanding. 11/6  Discussed knowledge quiz results verbalized understanding. 11/6  Quiz 1 11/6 Rehab and Strokes work no questions completed.12/16  HO Overview of admitting diagnosis. Discussed verbalized understanding.11/6  Quiz 4 11/6 Medications no questions not completed.12/16  HO Medications After Heart Attack (The Basics) discussed verbalized understanding.11/6  HO Tips on Taking Medication discussed verbalized understanding.11/6  HO How medicines help prevent heart attacks discussed verbalized understanding.11/6  Current BP at 101/64. 11/15   Cessation counselor reports patient states he has not smoked since 9/8/2024 when he had his event and he maintains free of cigarette smoking but he does still smoke 2 marijuana joints per day for pain management. 11/20  Patient is compliant with their medications and he carries updated medication list. He has a couple of medication changes at this time stopped aspirin and melatonin, and was put on Spironolactone (Aldactone) 25 MG tablet, Take 0.5 tablets by mouth daily. 12/11  Current BP at 95/56. 12/11  Patient had Life Vest removed EF is 55%. 12/12  Patient is compliant with their medications and he carries updated medication list. He has no medication changes at this time. 1/3  Current BP at 102/58. 1/3  Increased knowledge test score from 10 out of 15 to 15 out of 15. 1/29  Maintained Resting blood pressure of <130/80 while in the program. 1/29  Patient is compliant with their medications and he carries updated medication list. He has no medication changes at this time. 1/29  Current BP at 92/54. 1/29    Individual Patient Goals:    Gain Stamina Patient states he definitely feels like he has achieved both his goals. 1/29  Get to my new normal  Patient states he definitely feels like he has achieved both his goals. 1/29    Goal Status:  MET    Staff Comments:    Patient completed 35 sessions and program. Patient has worked extremely hard on all his MET goals while in Rehab final workout Mets 11.2  on Level 6 of equipment and he lifted 6lbs with 10 repetitions. He started on the treadmill at 2.7 MPH and was able to achieve 3.3 MPH at an incline of 4%.He overall worked hard and had good attitude in Rehab. Patients cardiac monitor has maintained in Normal sinus rhythm throughout exercise. He maintained good attendance and completed all scheduled education. He plans to continue his aerobic workouts at home of walking and elliptical 5x/week for 30+min/Day. He states he has had a 80% improvement in his strength and endurance since starting in Rehab. He completed Rehab with improvements in his 6MW with an increase of 328 ft. and in his Mets from 3.13 to 3.61. He also lost 1 inch from his waist circumference and maintained his weight the same. He also had improvements in CR knowledge score, SF-36, PQ9 and RYP scores Discharge packet was provided with information on Exercise cites in [place], warm up cool down exercises, Overview of Aerobic Exercising, RX of his exercises and information on what happens when you stop exercising for his continued success.         Rehab Staff Signature: Lelia Coronado, CV, RRT, RCP